# Patient Record
Sex: FEMALE | Race: WHITE | NOT HISPANIC OR LATINO | Employment: OTHER | URBAN - METROPOLITAN AREA
[De-identification: names, ages, dates, MRNs, and addresses within clinical notes are randomized per-mention and may not be internally consistent; named-entity substitution may affect disease eponyms.]

---

## 2019-12-11 ENCOUNTER — CONVERSION ENCOUNTER (OUTPATIENT)
Dept: FAMILY MEDICINE CLINIC | Facility: CLINIC | Age: 74
End: 2019-12-11

## 2019-12-11 ENCOUNTER — OFFICE VISIT CONVERTED (OUTPATIENT)
Dept: FAMILY MEDICINE CLINIC | Facility: CLINIC | Age: 74
End: 2019-12-11
Attending: NURSE PRACTITIONER

## 2019-12-26 ENCOUNTER — OFFICE VISIT CONVERTED (OUTPATIENT)
Dept: FAMILY MEDICINE CLINIC | Facility: CLINIC | Age: 74
End: 2019-12-26
Attending: FAMILY MEDICINE

## 2019-12-31 ENCOUNTER — OFFICE VISIT CONVERTED (OUTPATIENT)
Dept: FAMILY MEDICINE CLINIC | Facility: CLINIC | Age: 74
End: 2019-12-31
Attending: NURSE PRACTITIONER

## 2020-04-16 ENCOUNTER — TELEPHONE CONVERTED (OUTPATIENT)
Dept: FAMILY MEDICINE CLINIC | Facility: CLINIC | Age: 75
End: 2020-04-16
Attending: NURSE PRACTITIONER

## 2020-05-15 ENCOUNTER — HOSPITAL ENCOUNTER (OUTPATIENT)
Dept: FAMILY MEDICINE CLINIC | Facility: CLINIC | Age: 75
Discharge: HOME | End: 2020-05-15
Attending: NURSE PRACTITIONER

## 2020-05-15 ENCOUNTER — OFFICE VISIT CONVERTED (OUTPATIENT)
Dept: FAMILY MEDICINE CLINIC | Facility: CLINIC | Age: 75
End: 2020-05-15
Attending: NURSE PRACTITIONER

## 2020-05-15 LAB
ALBUMIN SERPL-MCNC: 4.3 G/DL (ref 3.5–5)
ALBUMIN/GLOB SERPL: 1.2 {RATIO} (ref 1.4–2.6)
ALP SERPL-CCNC: 61 U/L (ref 43–160)
ALT SERPL-CCNC: 19 U/L (ref 10–40)
ANION GAP SERPL CALC-SCNC: 18 MMOL/L (ref 8–19)
APPEARANCE UR: CLEAR
AST SERPL-CCNC: 28 U/L (ref 15–50)
BASOPHILS # BLD AUTO: 0.06 10*3/UL (ref 0–0.2)
BASOPHILS NFR BLD AUTO: 0.8 % (ref 0–3)
BILIRUB SERPL-MCNC: 0.29 MG/DL (ref 0.2–1.3)
BILIRUB UR QL: NEGATIVE
BUN SERPL-MCNC: 25 MG/DL (ref 5–25)
BUN/CREAT SERPL: 25 {RATIO} (ref 6–20)
CALCIUM SERPL-MCNC: 10.3 MG/DL (ref 8.7–10.4)
CHLORIDE SERPL-SCNC: 98 MMOL/L (ref 99–111)
CHOLEST SERPL-MCNC: 212 MG/DL (ref 107–200)
CHOLEST/HDLC SERPL: 4.3 {RATIO} (ref 3–6)
COLOR UR: YELLOW
CONV ABS IMM GRAN: 0.03 10*3/UL (ref 0–0.2)
CONV CO2: 27 MMOL/L (ref 22–32)
CONV COLLECTION SOURCE (UA): ABNORMAL
CONV IMMATURE GRAN: 0.4 % (ref 0–1.8)
CONV TOTAL PROTEIN: 8 G/DL (ref 6.3–8.2)
CONV UROBILINOGEN IN URINE BY AUTOMATED TEST STRIP: 1 {EHRLICHU}/DL (ref 0.1–1)
CREAT UR-MCNC: 1 MG/DL (ref 0.5–0.9)
DEPRECATED RDW RBC AUTO: 44.9 FL (ref 36.4–46.3)
EOSINOPHIL # BLD AUTO: 0.14 10*3/UL (ref 0–0.7)
EOSINOPHIL # BLD AUTO: 1.9 % (ref 0–7)
ERYTHROCYTE [DISTWIDTH] IN BLOOD BY AUTOMATED COUNT: 13.9 % (ref 11.7–14.4)
GFR SERPLBLD BASED ON 1.73 SQ M-ARVRAT: 56 ML/MIN/{1.73_M2}
GLOBULIN UR ELPH-MCNC: 3.7 G/DL (ref 2–3.5)
GLUCOSE SERPL-MCNC: 96 MG/DL (ref 65–99)
GLUCOSE UR QL: NEGATIVE MG/DL
HCT VFR BLD AUTO: 39.6 % (ref 37–47)
HDLC SERPL-MCNC: 49 MG/DL (ref 40–60)
HGB BLD-MCNC: 11.7 G/DL (ref 12–16)
HGB UR QL STRIP: NEGATIVE
KETONES UR QL STRIP: NEGATIVE MG/DL
LDLC SERPL CALC-MCNC: 130 MG/DL (ref 70–100)
LEUKOCYTE ESTERASE UR QL STRIP: NEGATIVE
LYMPHOCYTES # BLD AUTO: 2.15 10*3/UL (ref 1–5)
LYMPHOCYTES NFR BLD AUTO: 28.4 % (ref 20–45)
MCH RBC QN AUTO: 26 PG (ref 27–31)
MCHC RBC AUTO-ENTMCNC: 29.5 G/DL (ref 33–37)
MCV RBC AUTO: 88 FL (ref 81–99)
MONOCYTES # BLD AUTO: 0.52 10*3/UL (ref 0.2–1.2)
MONOCYTES NFR BLD AUTO: 6.9 % (ref 3–10)
NEUTROPHILS # BLD AUTO: 4.66 10*3/UL (ref 2–8)
NEUTROPHILS NFR BLD AUTO: 61.6 % (ref 30–85)
NITRITE UR QL STRIP: NEGATIVE
NRBC CBCN: 0 % (ref 0–0.7)
OSMOLALITY SERPL CALC.SUM OF ELEC: 292 MOSM/KG (ref 273–304)
PH UR STRIP.AUTO: 8.5 [PH] (ref 5–8)
PLATELET # BLD AUTO: 252 10*3/UL (ref 130–400)
PMV BLD AUTO: 11.2 FL (ref 9.4–12.3)
POTASSIUM SERPL-SCNC: 4.4 MMOL/L (ref 3.5–5.3)
PROT UR QL: NEGATIVE MG/DL
RBC # BLD AUTO: 4.5 10*6/UL (ref 4.2–5.4)
SODIUM SERPL-SCNC: 139 MMOL/L (ref 135–147)
SP GR UR: 1.02 (ref 1–1.03)
TRIGL SERPL-MCNC: 164 MG/DL (ref 40–150)
VLDLC SERPL-MCNC: 33 MG/DL (ref 5–37)
WBC # BLD AUTO: 7.56 10*3/UL (ref 4.8–10.8)

## 2020-06-10 ENCOUNTER — HOSPITAL ENCOUNTER (OUTPATIENT)
Dept: FAMILY MEDICINE CLINIC | Facility: CLINIC | Age: 75
Discharge: HOME | End: 2020-06-10
Attending: NURSE PRACTITIONER

## 2020-06-10 ENCOUNTER — OFFICE VISIT CONVERTED (OUTPATIENT)
Dept: FAMILY MEDICINE CLINIC | Facility: CLINIC | Age: 75
End: 2020-06-10
Attending: NURSE PRACTITIONER

## 2020-06-29 ENCOUNTER — OFFICE VISIT CONVERTED (OUTPATIENT)
Dept: NEUROSURGERY | Facility: CLINIC | Age: 75
End: 2020-06-29
Attending: PHYSICIAN ASSISTANT

## 2020-07-20 ENCOUNTER — HOSPITAL ENCOUNTER (OUTPATIENT)
Dept: MRI IMAGING | Facility: HOSPITAL | Age: 75
Discharge: HOME | End: 2020-07-20
Attending: PHYSICIAN ASSISTANT

## 2020-07-27 ENCOUNTER — OFFICE VISIT CONVERTED (OUTPATIENT)
Dept: NEUROSURGERY | Facility: CLINIC | Age: 75
End: 2020-07-27
Attending: PHYSICIAN ASSISTANT

## 2020-08-14 ENCOUNTER — HOSPITAL ENCOUNTER (OUTPATIENT)
Dept: OTHER | Facility: HOSPITAL | Age: 75
Discharge: HOME | End: 2020-08-14
Attending: PODIATRIST

## 2020-08-14 LAB
ANION GAP SERPL CALC-SCNC: 19 MMOL/L (ref 8–19)
BASOPHILS # BLD AUTO: 0.04 10*3/UL (ref 0–0.2)
BASOPHILS NFR BLD AUTO: 0.7 % (ref 0–3)
BUN SERPL-MCNC: 35 MG/DL (ref 5–25)
BUN/CREAT SERPL: 24 {RATIO} (ref 6–20)
CALCIUM SERPL-MCNC: 11 MG/DL (ref 8.7–10.4)
CHLORIDE SERPL-SCNC: 98 MMOL/L (ref 99–111)
CONV ABS IMM GRAN: 0.02 10*3/UL (ref 0–0.2)
CONV CO2: 26 MMOL/L (ref 22–32)
CONV IMMATURE GRAN: 0.3 % (ref 0–1.8)
CREAT UR-MCNC: 1.46 MG/DL (ref 0.5–0.9)
DEPRECATED RDW RBC AUTO: 46.1 FL (ref 36.4–46.3)
EOSINOPHIL # BLD AUTO: 0.15 10*3/UL (ref 0–0.7)
EOSINOPHIL # BLD AUTO: 2.5 % (ref 0–7)
ERYTHROCYTE [DISTWIDTH] IN BLOOD BY AUTOMATED COUNT: 14.6 % (ref 11.7–14.4)
GFR SERPLBLD BASED ON 1.73 SQ M-ARVRAT: 35 ML/MIN/{1.73_M2}
GLUCOSE SERPL-MCNC: 88 MG/DL (ref 65–99)
HCT VFR BLD AUTO: 40.7 % (ref 37–47)
HGB BLD-MCNC: 12.3 G/DL (ref 12–16)
INR PPP: 0.94 (ref 2–3)
LYMPHOCYTES # BLD AUTO: 1.68 10*3/UL (ref 1–5)
LYMPHOCYTES NFR BLD AUTO: 27.5 % (ref 20–45)
MCH RBC QN AUTO: 26.1 PG (ref 27–31)
MCHC RBC AUTO-ENTMCNC: 30.2 G/DL (ref 33–37)
MCV RBC AUTO: 86.4 FL (ref 81–99)
MONOCYTES # BLD AUTO: 0.46 10*3/UL (ref 0.2–1.2)
MONOCYTES NFR BLD AUTO: 7.5 % (ref 3–10)
NEUTROPHILS # BLD AUTO: 3.76 10*3/UL (ref 2–8)
NEUTROPHILS NFR BLD AUTO: 61.5 % (ref 30–85)
NRBC CBCN: 0 % (ref 0–0.7)
OSMOLALITY SERPL CALC.SUM OF ELEC: 295 MOSM/KG (ref 273–304)
PLATELET # BLD AUTO: 251 10*3/UL (ref 130–400)
PMV BLD AUTO: 10.4 FL (ref 9.4–12.3)
POTASSIUM SERPL-SCNC: 4.3 MMOL/L (ref 3.5–5.3)
PROTHROMBIN TIME: 10.2 S (ref 9.4–12)
RBC # BLD AUTO: 4.71 10*6/UL (ref 4.2–5.4)
SODIUM SERPL-SCNC: 139 MMOL/L (ref 135–147)
WBC # BLD AUTO: 6.11 10*3/UL (ref 4.8–10.8)

## 2020-08-21 ENCOUNTER — OFFICE VISIT CONVERTED (OUTPATIENT)
Dept: FAMILY MEDICINE CLINIC | Facility: CLINIC | Age: 75
End: 2020-08-21
Attending: NURSE PRACTITIONER

## 2020-08-21 ENCOUNTER — HOSPITAL ENCOUNTER (OUTPATIENT)
Dept: FAMILY MEDICINE CLINIC | Facility: CLINIC | Age: 75
Discharge: HOME | End: 2020-08-21
Attending: NURSE PRACTITIONER

## 2020-08-21 LAB
ALBUMIN SERPL-MCNC: 4.7 G/DL (ref 3.5–5)
ALBUMIN/GLOB SERPL: 1.5 {RATIO} (ref 1.4–2.6)
ALP SERPL-CCNC: 67 U/L (ref 43–160)
ALT SERPL-CCNC: 25 U/L (ref 10–40)
ANION GAP SERPL CALC-SCNC: 21 MMOL/L (ref 8–19)
AST SERPL-CCNC: 37 U/L (ref 15–50)
BASOPHILS # BLD AUTO: 0.04 10*3/UL (ref 0–0.2)
BASOPHILS NFR BLD AUTO: 0.6 % (ref 0–3)
BILIRUB SERPL-MCNC: 0.32 MG/DL (ref 0.2–1.3)
BUN SERPL-MCNC: 26 MG/DL (ref 5–25)
BUN/CREAT SERPL: 19 {RATIO} (ref 6–20)
CALCIUM SERPL-MCNC: 11.2 MG/DL (ref 8.7–10.4)
CHLORIDE SERPL-SCNC: 97 MMOL/L (ref 99–111)
CONV ABS IMM GRAN: 0.01 10*3/UL (ref 0–0.2)
CONV CO2: 26 MMOL/L (ref 22–32)
CONV IMMATURE GRAN: 0.1 % (ref 0–1.8)
CONV TOTAL PROTEIN: 7.9 G/DL (ref 6.3–8.2)
CREAT UR-MCNC: 1.39 MG/DL (ref 0.5–0.9)
DEPRECATED RDW RBC AUTO: 47.6 FL (ref 36.4–46.3)
EOSINOPHIL # BLD AUTO: 0.15 10*3/UL (ref 0–0.7)
EOSINOPHIL # BLD AUTO: 2.2 % (ref 0–7)
ERYTHROCYTE [DISTWIDTH] IN BLOOD BY AUTOMATED COUNT: 14.9 % (ref 11.7–14.4)
GFR SERPLBLD BASED ON 1.73 SQ M-ARVRAT: 37 ML/MIN/{1.73_M2}
GLOBULIN UR ELPH-MCNC: 3.2 G/DL (ref 2–3.5)
GLUCOSE SERPL-MCNC: 80 MG/DL (ref 65–99)
HCT VFR BLD AUTO: 39.9 % (ref 37–47)
HGB BLD-MCNC: 11.7 G/DL (ref 12–16)
LYMPHOCYTES # BLD AUTO: 1.96 10*3/UL (ref 1–5)
LYMPHOCYTES NFR BLD AUTO: 29.3 % (ref 20–45)
MCH RBC QN AUTO: 25.8 PG (ref 27–31)
MCHC RBC AUTO-ENTMCNC: 29.3 G/DL (ref 33–37)
MCV RBC AUTO: 87.9 FL (ref 81–99)
MONOCYTES # BLD AUTO: 0.5 10*3/UL (ref 0.2–1.2)
MONOCYTES NFR BLD AUTO: 7.5 % (ref 3–10)
NEUTROPHILS # BLD AUTO: 4.03 10*3/UL (ref 2–8)
NEUTROPHILS NFR BLD AUTO: 60.3 % (ref 30–85)
NRBC CBCN: 0 % (ref 0–0.7)
OSMOLALITY SERPL CALC.SUM OF ELEC: 292 MOSM/KG (ref 273–304)
PLATELET # BLD AUTO: 249 10*3/UL (ref 130–400)
PMV BLD AUTO: 11.2 FL (ref 9.4–12.3)
POTASSIUM SERPL-SCNC: 4.8 MMOL/L (ref 3.5–5.3)
RBC # BLD AUTO: 4.54 10*6/UL (ref 4.2–5.4)
SODIUM SERPL-SCNC: 139 MMOL/L (ref 135–147)
WBC # BLD AUTO: 6.69 10*3/UL (ref 4.8–10.8)

## 2020-08-22 LAB — HCV AB S/CO SERPL IA: <0.1 S/CO RATIO (ref 0–0.9)

## 2020-08-28 ENCOUNTER — HOSPITAL ENCOUNTER (OUTPATIENT)
Dept: OTHER | Facility: HOSPITAL | Age: 75
Discharge: HOME | End: 2020-08-28
Attending: NURSE PRACTITIONER

## 2020-08-28 ENCOUNTER — HOSPITAL ENCOUNTER (OUTPATIENT)
Dept: FAMILY MEDICINE CLINIC | Facility: CLINIC | Age: 75
Discharge: HOME | End: 2020-08-28
Attending: NURSE PRACTITIONER

## 2020-08-28 LAB
ALBUMIN SERPL-MCNC: 4.4 G/DL (ref 3.5–5)
ALBUMIN/GLOB SERPL: 1.2 {RATIO} (ref 1.4–2.6)
ALP SERPL-CCNC: 64 U/L (ref 43–160)
ALT SERPL-CCNC: 23 U/L (ref 10–40)
ANION GAP SERPL CALC-SCNC: 21 MMOL/L (ref 8–19)
AST SERPL-CCNC: 29 U/L (ref 15–50)
BILIRUB SERPL-MCNC: 0.34 MG/DL (ref 0.2–1.3)
BUN SERPL-MCNC: 30 MG/DL (ref 5–25)
BUN/CREAT SERPL: 20 {RATIO} (ref 6–20)
CALCIUM SERPL-MCNC: 11 MG/DL (ref 8.7–10.4)
CHLORIDE SERPL-SCNC: 100 MMOL/L (ref 99–111)
CONV CO2: 22 MMOL/L (ref 22–32)
CONV TOTAL PROTEIN: 8 G/DL (ref 6.3–8.2)
CREAT UR-MCNC: 1.48 MG/DL (ref 0.5–0.9)
GFR SERPLBLD BASED ON 1.73 SQ M-ARVRAT: 35 ML/MIN/{1.73_M2}
GLOBULIN UR ELPH-MCNC: 3.6 G/DL (ref 2–3.5)
GLUCOSE SERPL-MCNC: 125 MG/DL (ref 65–99)
MAGNESIUM SERPL-MCNC: 2.03 MG/DL (ref 1.6–2.3)
OSMOLALITY SERPL CALC.SUM OF ELEC: 294 MOSM/KG (ref 273–304)
POTASSIUM SERPL-SCNC: 4.6 MMOL/L (ref 3.5–5.3)
SODIUM SERPL-SCNC: 138 MMOL/L (ref 135–147)

## 2020-09-02 ENCOUNTER — OFFICE VISIT CONVERTED (OUTPATIENT)
Dept: NEUROSURGERY | Facility: CLINIC | Age: 75
End: 2020-09-02
Attending: PHYSICIAN ASSISTANT

## 2020-09-02 ENCOUNTER — CONVERSION ENCOUNTER (OUTPATIENT)
Dept: OTHER | Facility: HOSPITAL | Age: 75
End: 2020-09-02

## 2020-09-09 LAB — PTH RELATED PROT SERPL-SCNC: <2 PMOL/L

## 2020-10-20 ENCOUNTER — OFFICE VISIT CONVERTED (OUTPATIENT)
Dept: NEUROLOGY | Facility: CLINIC | Age: 75
End: 2020-10-20
Attending: PSYCHIATRY & NEUROLOGY

## 2020-10-20 ENCOUNTER — CONVERSION ENCOUNTER (OUTPATIENT)
Dept: NEUROLOGY | Facility: CLINIC | Age: 75
End: 2020-10-20

## 2020-10-28 ENCOUNTER — OFFICE VISIT CONVERTED (OUTPATIENT)
Dept: NEUROSURGERY | Facility: CLINIC | Age: 75
End: 2020-10-28
Attending: PHYSICIAN ASSISTANT

## 2020-10-28 ENCOUNTER — CONVERSION ENCOUNTER (OUTPATIENT)
Dept: NEUROLOGY | Facility: CLINIC | Age: 75
End: 2020-10-28

## 2020-11-18 ENCOUNTER — HOSPITAL ENCOUNTER (OUTPATIENT)
Dept: OTHER | Facility: HOSPITAL | Age: 75
Discharge: HOME | End: 2020-11-18
Attending: NURSE PRACTITIONER

## 2020-12-11 ENCOUNTER — CONVERSION ENCOUNTER (OUTPATIENT)
Dept: FAMILY MEDICINE CLINIC | Facility: CLINIC | Age: 75
End: 2020-12-11

## 2020-12-11 ENCOUNTER — OFFICE VISIT CONVERTED (OUTPATIENT)
Dept: FAMILY MEDICINE CLINIC | Facility: CLINIC | Age: 75
End: 2020-12-11
Attending: NURSE PRACTITIONER

## 2020-12-11 ENCOUNTER — HOSPITAL ENCOUNTER (OUTPATIENT)
Dept: FAMILY MEDICINE CLINIC | Facility: CLINIC | Age: 75
Discharge: HOME | End: 2020-12-11
Attending: NURSE PRACTITIONER

## 2020-12-11 LAB
ANION GAP SERPL CALC-SCNC: 16 MMOL/L (ref 8–19)
BASOPHILS # BLD AUTO: 0.04 10*3/UL (ref 0–0.2)
BASOPHILS NFR BLD AUTO: 0.5 % (ref 0–3)
BNP SERPL-MCNC: 34 PG/ML (ref 0–900)
BUN SERPL-MCNC: 25 MG/DL (ref 5–25)
BUN/CREAT SERPL: 21 {RATIO} (ref 6–20)
CALCIUM SERPL-MCNC: 10.9 MG/DL (ref 8.7–10.4)
CHLORIDE SERPL-SCNC: 100 MMOL/L (ref 99–111)
CONV ABS IMM GRAN: 0.02 10*3/UL (ref 0–0.2)
CONV CO2: 27 MMOL/L (ref 22–32)
CONV IMMATURE GRAN: 0.2 % (ref 0–1.8)
CREAT UR-MCNC: 1.17 MG/DL (ref 0.5–0.9)
DEPRECATED RDW RBC AUTO: 43.7 FL (ref 36.4–46.3)
EOSINOPHIL # BLD AUTO: 0.12 10*3/UL (ref 0–0.7)
EOSINOPHIL # BLD AUTO: 1.5 % (ref 0–7)
ERYTHROCYTE [DISTWIDTH] IN BLOOD BY AUTOMATED COUNT: 14.4 % (ref 11.7–14.4)
FERRITIN SERPL-MCNC: 98 NG/ML (ref 10–200)
FOLATE SERPL-MCNC: >20 NG/ML (ref 4.8–20)
GFR SERPLBLD BASED ON 1.73 SQ M-ARVRAT: 46 ML/MIN/{1.73_M2}
GLUCOSE SERPL-MCNC: 104 MG/DL (ref 65–99)
HCT VFR BLD AUTO: 39.1 % (ref 37–47)
HGB BLD-MCNC: 11.4 G/DL (ref 12–16)
IRON SATN MFR SERPL: 10 % (ref 20–55)
IRON SERPL-MCNC: 39 UG/DL (ref 60–170)
LYMPHOCYTES # BLD AUTO: 2.51 10*3/UL (ref 1–5)
LYMPHOCYTES NFR BLD AUTO: 30.4 % (ref 20–45)
MCH RBC QN AUTO: 24.5 PG (ref 27–31)
MCHC RBC AUTO-ENTMCNC: 29.2 G/DL (ref 33–37)
MCV RBC AUTO: 84.1 FL (ref 81–99)
MONOCYTES # BLD AUTO: 0.57 10*3/UL (ref 0.2–1.2)
MONOCYTES NFR BLD AUTO: 6.9 % (ref 3–10)
NEUTROPHILS # BLD AUTO: 4.99 10*3/UL (ref 2–8)
NEUTROPHILS NFR BLD AUTO: 60.5 % (ref 30–85)
NRBC CBCN: 0 % (ref 0–0.7)
OSMOLALITY SERPL CALC.SUM OF ELEC: 291 MOSM/KG (ref 273–304)
PLATELET # BLD AUTO: 300 10*3/UL (ref 130–400)
PMV BLD AUTO: 11.1 FL (ref 9.4–12.3)
POTASSIUM SERPL-SCNC: 4.9 MMOL/L (ref 3.5–5.3)
RBC # BLD AUTO: 4.65 10*6/UL (ref 4.2–5.4)
SODIUM SERPL-SCNC: 138 MMOL/L (ref 135–147)
T4 FREE SERPL-MCNC: 1.2 NG/DL (ref 0.9–1.8)
TIBC SERPL-MCNC: 390 UG/DL (ref 245–450)
TRANSFERRIN SERPL-MCNC: 273 MG/DL (ref 250–380)
TSH SERPL-ACNC: 1.93 M[IU]/L (ref 0.27–4.2)
VIT B12 SERPL-MCNC: 514 PG/ML (ref 211–911)
WBC # BLD AUTO: 8.25 10*3/UL (ref 4.8–10.8)

## 2020-12-18 ENCOUNTER — HOSPITAL ENCOUNTER (OUTPATIENT)
Dept: OTHER | Facility: HOSPITAL | Age: 75
Discharge: HOME | End: 2020-12-18
Attending: NURSE PRACTITIONER

## 2020-12-22 ENCOUNTER — OFFICE VISIT CONVERTED (OUTPATIENT)
Dept: NEUROSURGERY | Facility: CLINIC | Age: 75
End: 2020-12-22
Attending: NEUROLOGICAL SURGERY

## 2021-01-07 ENCOUNTER — HOSPITAL ENCOUNTER (OUTPATIENT)
Dept: OTHER | Facility: HOSPITAL | Age: 76
Discharge: HOME | End: 2021-01-07
Attending: NURSE PRACTITIONER

## 2021-01-07 LAB
25(OH)D3 SERPL-MCNC: 42.7 NG/ML (ref 30–100)
ALBUMIN SERPL-MCNC: 4.4 G/DL (ref 3.5–5)
ALBUMIN/GLOB SERPL: 1.2 {RATIO} (ref 1.4–2.6)
ALP SERPL-CCNC: 72 U/L (ref 43–160)
ALT SERPL-CCNC: 18 U/L (ref 10–40)
ANION GAP SERPL CALC-SCNC: 21 MMOL/L (ref 8–19)
APPEARANCE UR: ABNORMAL
AST SERPL-CCNC: 27 U/L (ref 15–50)
BASOPHILS # BLD AUTO: 0.05 10*3/UL (ref 0–0.2)
BASOPHILS NFR BLD AUTO: 0.6 % (ref 0–3)
BILIRUB SERPL-MCNC: 0.25 MG/DL (ref 0.2–1.3)
BILIRUB UR QL: NEGATIVE
BUN SERPL-MCNC: 20 MG/DL (ref 5–25)
BUN/CREAT SERPL: 19 {RATIO} (ref 6–20)
CALCIUM SERPL-MCNC: 10.6 MG/DL (ref 8.7–10.4)
CHLORIDE SERPL-SCNC: 100 MMOL/L (ref 99–111)
COLOR UR: YELLOW
CONV ABS IMM GRAN: 0.02 10*3/UL (ref 0–0.2)
CONV BACTERIA: NEGATIVE
CONV CO2: 22 MMOL/L (ref 22–32)
CONV COLLECTION SOURCE (UA): ABNORMAL
CONV CREATININE URINE, RANDOM: 97.5 MG/DL (ref 10–300)
CONV IMMATURE GRAN: 0.2 % (ref 0–1.8)
CONV PROTEIN TO CREATININE RATIO (RANDOM URINE): 0.08 {RATIO} (ref 0–0.1)
CONV TOTAL PROTEIN: 8.1 G/DL (ref 6.3–8.2)
CONV UROBILINOGEN IN URINE BY AUTOMATED TEST STRIP: 1 {EHRLICHU}/DL (ref 0.1–1)
CREAT UR-MCNC: 1.08 MG/DL (ref 0.5–0.9)
DEPRECATED RDW RBC AUTO: 45.4 FL (ref 36.4–46.3)
EOSINOPHIL # BLD AUTO: 0.11 10*3/UL (ref 0–0.7)
EOSINOPHIL # BLD AUTO: 1.4 % (ref 0–7)
ERYTHROCYTE [DISTWIDTH] IN BLOOD BY AUTOMATED COUNT: 14.9 % (ref 11.7–14.4)
GFR SERPLBLD BASED ON 1.73 SQ M-ARVRAT: 50 ML/MIN/{1.73_M2}
GLOBULIN UR ELPH-MCNC: 3.7 G/DL (ref 2–3.5)
GLUCOSE SERPL-MCNC: 86 MG/DL (ref 65–99)
GLUCOSE UR QL: NEGATIVE MG/DL
HCT VFR BLD AUTO: 40.1 % (ref 37–47)
HGB BLD-MCNC: 11.5 G/DL (ref 12–16)
HGB UR QL STRIP: NEGATIVE
KETONES UR QL STRIP: NEGATIVE MG/DL
LEUKOCYTE ESTERASE UR QL STRIP: NEGATIVE
LYMPHOCYTES # BLD AUTO: 2.53 10*3/UL (ref 1–5)
LYMPHOCYTES NFR BLD AUTO: 31.4 % (ref 20–45)
MCH RBC QN AUTO: 23.9 PG (ref 27–31)
MCHC RBC AUTO-ENTMCNC: 28.7 G/DL (ref 33–37)
MCV RBC AUTO: 83.4 FL (ref 81–99)
MONOCYTES # BLD AUTO: 0.55 10*3/UL (ref 0.2–1.2)
MONOCYTES NFR BLD AUTO: 6.8 % (ref 3–10)
NEUTROPHILS # BLD AUTO: 4.81 10*3/UL (ref 2–8)
NEUTROPHILS NFR BLD AUTO: 59.6 % (ref 30–85)
NITRITE UR QL STRIP: NEGATIVE
NRBC CBCN: 0 % (ref 0–0.7)
OSMOLALITY SERPL CALC.SUM OF ELEC: 290 MOSM/KG (ref 273–304)
PH UR STRIP.AUTO: 6.5 [PH] (ref 5–8)
PHOSPHATE SERPL-MCNC: 3.1 MG/DL (ref 2.4–4.5)
PLATELET # BLD AUTO: 265 10*3/UL (ref 130–400)
PMV BLD AUTO: 11 FL (ref 9.4–12.3)
POTASSIUM SERPL-SCNC: 4.3 MMOL/L (ref 3.5–5.3)
PROT UR QL: NEGATIVE MG/DL
PROT UR-MCNC: 7.5 MG/DL
PTH-INTACT SERPL-MCNC: 52.6 PG/ML (ref 11.1–79.5)
RBC # BLD AUTO: 4.81 10*6/UL (ref 4.2–5.4)
RBC #/AREA URNS HPF: ABNORMAL /[HPF]
SODIUM SERPL-SCNC: 139 MMOL/L (ref 135–147)
SP GR UR: 1.02 (ref 1–1.03)
WBC # BLD AUTO: 8.07 10*3/UL (ref 4.8–10.8)
WBC #/AREA URNS HPF: ABNORMAL /[HPF]

## 2021-01-08 ENCOUNTER — OFFICE VISIT CONVERTED (OUTPATIENT)
Dept: CARDIOLOGY | Facility: CLINIC | Age: 76
End: 2021-01-08
Attending: INTERNAL MEDICINE

## 2021-01-12 ENCOUNTER — HOSPITAL ENCOUNTER (OUTPATIENT)
Dept: NUCLEAR MEDICINE | Facility: HOSPITAL | Age: 76
Discharge: HOME | End: 2021-01-12
Attending: INTERNAL MEDICINE

## 2021-03-02 ENCOUNTER — HOSPITAL ENCOUNTER (OUTPATIENT)
Dept: VACCINE CLINIC | Facility: HOSPITAL | Age: 76
Discharge: HOME | End: 2021-03-02
Attending: INTERNAL MEDICINE

## 2021-03-22 ENCOUNTER — OFFICE VISIT CONVERTED (OUTPATIENT)
Dept: FAMILY MEDICINE CLINIC | Facility: CLINIC | Age: 76
End: 2021-03-22
Attending: NURSE PRACTITIONER

## 2021-03-23 ENCOUNTER — HOSPITAL ENCOUNTER (OUTPATIENT)
Dept: VACCINE CLINIC | Facility: HOSPITAL | Age: 76
Discharge: HOME | End: 2021-03-23
Attending: INTERNAL MEDICINE

## 2021-04-06 ENCOUNTER — HOSPITAL ENCOUNTER (OUTPATIENT)
Dept: OTHER | Facility: HOSPITAL | Age: 76
Discharge: HOME | End: 2021-04-06
Attending: NURSE PRACTITIONER

## 2021-05-09 VITALS
DIASTOLIC BLOOD PRESSURE: 70 MMHG | TEMPERATURE: 97.5 F | HEART RATE: 96 BPM | SYSTOLIC BLOOD PRESSURE: 114 MMHG | BODY MASS INDEX: 33.88 KG/M2 | HEIGHT: 59 IN | OXYGEN SATURATION: 98 % | WEIGHT: 168.06 LBS

## 2021-05-09 VITALS
SYSTOLIC BLOOD PRESSURE: 140 MMHG | BODY MASS INDEX: 33.77 KG/M2 | TEMPERATURE: 98.4 F | WEIGHT: 172 LBS | HEIGHT: 60 IN | DIASTOLIC BLOOD PRESSURE: 80 MMHG | OXYGEN SATURATION: 98 % | HEART RATE: 90 BPM

## 2021-05-09 VITALS
OXYGEN SATURATION: 98 % | HEART RATE: 104 BPM | WEIGHT: 172.5 LBS | DIASTOLIC BLOOD PRESSURE: 68 MMHG | TEMPERATURE: 97.2 F | BODY MASS INDEX: 34.77 KG/M2 | SYSTOLIC BLOOD PRESSURE: 116 MMHG | HEIGHT: 59 IN

## 2021-05-09 VITALS
HEART RATE: 94 BPM | OXYGEN SATURATION: 94 % | TEMPERATURE: 97.1 F | SYSTOLIC BLOOD PRESSURE: 128 MMHG | HEIGHT: 60 IN | WEIGHT: 172.5 LBS | DIASTOLIC BLOOD PRESSURE: 80 MMHG | BODY MASS INDEX: 33.86 KG/M2

## 2021-05-09 VITALS
DIASTOLIC BLOOD PRESSURE: 75 MMHG | WEIGHT: 165.37 LBS | HEART RATE: 100 BPM | TEMPERATURE: 97.3 F | OXYGEN SATURATION: 95 % | SYSTOLIC BLOOD PRESSURE: 99 MMHG

## 2021-05-09 VITALS
OXYGEN SATURATION: 96 % | TEMPERATURE: 97.2 F | SYSTOLIC BLOOD PRESSURE: 140 MMHG | DIASTOLIC BLOOD PRESSURE: 72 MMHG | HEART RATE: 100 BPM | WEIGHT: 172.25 LBS

## 2021-05-09 VITALS
SYSTOLIC BLOOD PRESSURE: 118 MMHG | HEART RATE: 78 BPM | TEMPERATURE: 96.9 F | OXYGEN SATURATION: 96 % | BODY MASS INDEX: 31.61 KG/M2 | DIASTOLIC BLOOD PRESSURE: 70 MMHG | HEIGHT: 60 IN | WEIGHT: 161 LBS

## 2021-05-09 VITALS
TEMPERATURE: 97.1 F | HEIGHT: 61 IN | WEIGHT: 165 LBS | HEART RATE: 82 BPM | DIASTOLIC BLOOD PRESSURE: 90 MMHG | BODY MASS INDEX: 31.15 KG/M2 | SYSTOLIC BLOOD PRESSURE: 150 MMHG | OXYGEN SATURATION: 98 %

## 2021-05-10 NOTE — H&P
History and Physical      Patient Name: Asya Burris   Patient ID: [id number]   Sex: Female   YOB: 1945    Primary Care Provider: Jess FLORENCE   Referring Provider: Jess FLORENCE    Visit Date: January 8, 2021    Provider: Mario Valdivia MD   Location: [facility]   Location Address: [address] [place] [address], [facility]   [place], [place]  [id number]   Location Phone: [phone]          Chief Complaint     Shortness of breath.       History Of Present Illness  Consult requested by: Jess FLORENCE   Asya Burris is a 75-year-old White female who has not been followed by Cardiology in the past. She is accompanied by her daughter. Over time, she has noticed more shortness of breath with exertion, with more common activities, such as walking up the steps, or doing light housework she tires easily. She occasionally has a minor lightheadedness with this and has had some intermittent chest pressure, as well. The chest pressure is not consistent and does not occur every time she exerts herself. Her weight has been stable. She denies significant palpitations. Her medications have been stable. She has not had a recent cardiac workup.   PAST MEDICAL & SURGICAL HISTORY: Anxiety/Depression; Arthritis; Hypertension; Sleep apnea, which is not treated over the years. Patient cannot tolerate CPAP therapy; Appendectomy; Hysterectomy; Ankle and foot surgery; Carpal tunnel release.   PSYCHOSOCIAL HISTORY: No tobacco use. No alcohol use. Moderate caffeine intake. She is  x18 years. She is retired.   FAMILY HISTORY: Positive for hypertension.   CURRENT MEDICATIONS: Bupropion  mg daily; losartan 100 mg daily; omeprazole 40 mg daily; sertraline 50 mg daily; trazodone 50 mg daily.   ALLERGIES: No known drug allergies.       Review of Systems  · Constitutional  o Admits  o : fatigue  o Denies  o : recent weight changes   · Eyes  o Denies  o : blurred  "vision  · HENT  o Admits  o : hearing loss or ringing  o Denies  o : chronic sinus problem, swollen glands in neck  · Cardiovascular  o Admits  o : shortness of breath with activities   o Denies  o : chest pain, palpitations (fast, fluttering, or skipping beats), swelling (feet, ankles, hands)  · Respiratory  o Admits  o : chronic or frequent cough  o Denies  o : asthma or wheezing, COPD  · Gastrointestinal  o Denies  o : ulcers, nausea or vomiting  · Neurologic  o Admits  o : lightheaded or dizzy, headaches  o Denies  o : stroke  · Musculoskeletal  o Admits  o : joint pain, back pain  · Endocrine  o Denies  o : thyroid disease, diabetes, heat or cold intolerance, excessive thirst or urination  · Heme-Lymph  o Admits  o : anemia  o Denies  o : bleeding or bruising tendency      Vitals  Date Time BP Position Site L\R Cuff Size HR RR TEMP (F) WT  HT  BMI kg/m2 BSA m2 O2 Sat FR L/min FiO2 HC       01/08/2021 12:15 /80 Sitting    92 - R   165lbs 0oz 5'  1\" 31.18 1.79             Physical Examination  · Constitutional  o Appearance  o : Elderly, White female, pleasant, in no acute distress.  · Head and Face  o HEENT  o : No pallor, anicteric. Eyes normal. Moist mucous membranes.  · Neck  o Inspection/Palpation  o : Supple.   o Jugular Veins  o : No JVD. No carotid bruits.  · Respiratory  o Auscultation of Lungs  o : Clear to auscultation bilaterally. No crackles or wheezing.  · Cardiovascular  o Heart  o : S1, S2 is normally heard. No S3. No murmur, rubs, or gallops.  · Gastrointestinal  o Abdominal Examination  o : Soft, non-distended. No palpable hepatosplenomegaly. Bowel sounds heard in all four quadrants.  · Musculoskeletal  o General  o : Normal muscle tone and strength.  · Skin and Subcutaneous Tissue  o General Inspection  o : No skin rashes.  · Extremities  o Extremities  o : Warm and well perfused. Distal pulses present. No pitting pedal edema.  · EKG  o EKG  o : Performed in the office today, reviewed by " me.   o Indications  o : Shortness of breath.  o Results  o : Sinus rhythm, tiny inferior Q waves, no ST changes, otherwise normal EKG.  o Comparison  o : No previous for comparison.   · Labs  o Labs  o : Recent CBC showed slight anemia, hemoglobin 11.4, BUN 25, creatinine 1.17, iron 39, which is low, BNP 34, which is normal.   · Echocardiogram  o Echocardiogram  o : Recent echocardiogram showed an ejection fraction of 60% with grade I diastolic dysfunction, trace tricuspid regurgitation, and mild fibrotic changes of the mitral and aortic valves.  · Data  o Data  o : Primary Care records reviewed.          Assessment     1.  Progressive shortness of breath with exertion.  This has been going on for the better part of 2 years.  Her        symptoms have been somewhat indolent.  She has minor chest discomfort at times.  Her echo done        recently shows no structural abnormalities that would explain her symptoms.  She is mildly anemic, but not        to the extent I would expect her to have shortness of breath.  She does admit to being less active over the        past 2 years since relocating from [place].  Her risk factors for CAD include age and hypertension.    2.  Hypertension.  Currently controlled.  3.  Sleep apnea.  Untreated since the patient cannot tolerate CPAP therapy.       Plan     Stress imaging will be scheduled to evaluate for ischemic heart disease.  I agree with her current medical therapy.  If her stress imaging appears normal, I would recommend that she try to have some sort of exercise program, ideally a walking program to try to improve her exertional tolerance and stamina.  If she continues to have significant limiting symptoms despite this, coronary angiography can be considered in the future, but currently, I would only recommend that if we see significant ischemic defects on a stress test.  She is agreeable with this plan.  We will call the patient with the results, and otherwise follow as  needed.      It is a pleasure to assist in her care.  Please let me know if you have any questions regarding her case.    GEORGES Valdivia MD  CBD:vm             Electronically Signed by: Tori Amaya-, Other -Author on January 11, 2021 10:00:43 AM  Electronically Co-signed by: Mario Valdivia MD -Reviewer on January 11, 2021 12:38:50 PM

## 2021-05-10 NOTE — H&P
History and Physical      Patient Name: Asya Burris   Patient ID: [id number]   Sex: Female   YOB: 1945    Referring Provider: Kaylee FLORENCE    Visit Date: June 29, 2020    Provider: Codi Carey PA-C   Location: [facility]   Location Address: [address] [place] [address]  [id number]   Location Phone: [phone]          Chief Complaint     Patient is here with complaints of hip and foot pain.       History Of Present Illness  The patient is an 74 year old female, who presents on referral from Kaylee FLORENCE, for a neurosurgical evaluation of low back pain and left leg pain.   The left leg pain involves the left lower leg in a nonspecific distribution. The pain developed gradually several years ago. It is 10/10 in severity has an aching, a dull, and a sharp quality and radiates into the left foot in a nonspecific distribution. The pain has been constant. The pain tends to be maximal in the afternoon. The patient states the pain is aggravated by prolonged standing, walking long distances, and Getting up from seated position. No alleviating factors are reported.   She also reports urinary incontinence. The incontinence has been ongoing and developed several months ago. The patient has no prior history of neck or back surgery.   RECENT INTERVENTIONS:  She has not had any recent treatment for this problem, except as above.   INFORMATION REVIEWED:  The following information was reviewed: radiology reports and images. Lumbar radiographs revealed degenerative disk disease. The degenerative disc disease is present at multiple levels.      She also complains of neck pain and arm stiffness.       Review of Systems  · Constitutional  o Denies  o : chills, excessive sweating, fatigue, fever, sycope/passing out, weight gain, weight loss  · Eyes  o Denies  o : changes in vision, blurry vision, double vision  · HENT  o Denies  o : loss of hearing, ringing in the ears, ear  aches, sore throat, nasal congestion, sinus pain, nose bleeds, seasonal allergies  · Cardiovascular  o Denies  o : blood clots, swollen legs, anemia, easy burising or bleeding, transfusions  · Respiratory  o Denies  o : shortness of breath, dry cough, productive cough, pneumonia, COPD  · Gastrointestinal  o Denies  o : difficulty swallowing, reflux  · Genitourinary  o Denies  o : incontinence  · Neurologic  o Denies  o : headache, seizure, stroke, tremor, loss of balance, falls, dizziness/vertigo, difficulty with sleep, numbness/tingling/paresthesia , difficulty with coordination, difficulty with dexterity, weakness  · Musculoskeletal  o Admits  o : joint pain, low back pain  o Denies  o : neck stiffness/pain, swollen lymph nodes, muscle aches, weakness, spasms, sciatica, pain radiating in arm, pain radiating in leg  · Endocrine  o Denies  o : diabetes, thyroid disorder  · Psychiatric  o Denies  o : anxiety, depression      Vitals  Date Time BP Position Site L\R Cuff Size HR RR TEMP (F) WT  HT  BMI kg/m2 BSA m2 O2 Sat        06/29/2020 11:23 AM        97.5               Physical Examination  · Constitutional  o Appearance  o : well-nourished, well developed, alert, in no acute distress  · Respiratory  o Respiratory Effort  o : breathing unlabored  · Cardiovascular  o Peripheral Vascular System  o :   § Extremities  § : no edema or cyanosis  · Musculoskeletal  o Spine  o :   § Inspection/Palpation  § : tenderness to palpation present in left lower back  o Right Lower Extremity  o :   § Inspection/Palpation  § : no joint or limb tenderness to palpation, no edema present, no ecchymosis  § Joint Stability  § : joint stability within normal limits  § Range of Motion  § : range of motion normal, no joint crepitations present, no pain on motion, Glen's test negative  o Left Lower Extremity  o :   § Inspection/Palpation  § : no joint or limb tenderness to palpation, no edema present, no ecchymosis  § Joint  Stability  § : joint stability within normal limits  § Range of Motion  § : range of motion normal, no joint crepitations present, no pain on motion, Glen's test negative  · Skin and Subcutaneous Tissue  o Extremities  o :   § Right Lower Extremity  § : no lesions or areas of discoloration  § Left Lower Extremity  § : no lesions or areas of discoloration  o Back  o : no lesions or areas of discoloration  · Neurologic  o Mental Status Examination  o :   § Orientation  § : alert and oriented to time, person, place and events  o Motor Examination  o :   § RLE Strength  § : strength normal  § RLE Motor Function  § : tone normal, no atrophy, no abnormal movements noted  § LLE Strength  § : strength normal  § LLE Motor Function  § : tone normal, no atrophy, no abnormal movements noted  o Reflexes  o :   § RLE  § : knee and ankle reflexes 2/4, SLR negative  § LLE  § : knee and ankle reflexes 2/4, SLR negative  o Sensation  o :   § Light Touch  § : sensation intact to light touch in extremities  o Gait and Station  o :   § Gait Screening  § : limping gait. Unsteady gait.   · Psychiatric  o Mood and Affect  o : mood normal, affect appropriate     Tenderness in neck.           Assessment  · Lumbago/low back pain     724.3/M54.40  · Sciatica     724.3/M54.30  · Cervicalgia     723.1/M54.2  · Cervical radiculopathy     723.4/M54.12      Plan  · Orders  o MRI lumbar spine w/o contrast (50041) - 724.3/M54.30, 724.3/M54.40 - 06/29/2020  o MRI cervical spine w/o contrast (93559) - 723.1/M54.2, 723.4/M54.12 - 06/29/2020  · Medications  o Medications have been Reconciled  o Transition of Care or Provider Policy  · Instructions  o Encouraged to follow-up with Primary Care Provider for preventative care.  o The ROS and the PFSH were reviewed at today's visit.  o I have discussed the risks and benefits of surgery versus physical therapy, epidural steroids, and other conservative forms of treatment.  o Given these options, the patient  wishes to exhaust all forms of non-operative management.  o Handouts provided: Non-Surgical Treatments for Back Pain/Degenerative Disc Disease.  o We have discussed the benefits of core strengthening. Patient will work on improving the core muscle strength with low impact activities such as walking, swimming, Pilates, etc.   o Call or return to office if symptoms worsen or persist.  o Will order MRI of Lspine and Cspine and return afterwards. Has been seeing podiatrist and is getting a boot made for left foot pain.   o Electronically Identified Patient Education Materials Provided Electronically  · Disposition  o Call or Return if symptoms worsen or persist.            Electronically Signed by: Codi Carey PA-C -Author on June 29, 2020 11:55:03 AM

## 2021-05-13 NOTE — PROGRESS NOTES
Progress Note      Patient Name: Asya Burris   Patient ID: [id number]   Sex: Female   YOB: 1945    Referring Provider: Kaylee FLORENCE    Visit Date: July 27, 2020    Provider: Codi Carey PA-C   Location: [facility]   Location Address: [address] [place] [address]  [id number]   Location Phone: [phone]          Chief Complaint     Patient is here to discuss MRI results.       History Of Present Illness  The patient is a 74 year old female who is in the office for followup appointment. MRI of Cspine showed facet arthritis at several levels. There is stenosis at C6/7 and C5/6. There is foraminal narrowing at several levels as well. MRI of Lspine showed facet arthritis at several levels with stenosis at L4/5. There is foraminal narrowing at several levels. She continues to have low back and neck pain. She notes that she is more worried her soon foot surgery.       Past Medical History  Lumbago         Past Surgical History  Hand surgery         Medication List  bupropion HCl 150 mg oral tablet sustained-release 12 hr; diclofenac sodium 75 mg oral tablet,delayed release (DR/EC); losartan-hydrochlorothiazide 100-12.5 mg oral tablet; omeprazole 40 mg oral capsule,delayed release(DR/EC); sertraline 100 mg oral tablet; trazodone 50 mg oral tablet         Allergy List  NO KNOWN DRUG ALLERGIES       Allergies Reconciled  Family Medical History  *No Known Family History         Social History  Tobacco (Never)         Review of Systems  · Constitutional  o Denies  o : chills, excessive sweating, fatigue, fever, sycope/passing out, weight gain, weight loss  · Eyes  o Denies  o : changes in vision, blurry vision, double vision  · HENT  o Denies  o : loss of hearing, ringing in the ears, ear aches, sore throat, nasal congestion, sinus pain, nose bleeds, seasonal allergies  · Cardiovascular  o Denies  o : blood clots, swollen legs, anemia, easy burising or bleeding,  "transfusions  · Respiratory  o Denies  o : shortness of breath, dry cough, productive cough, pneumonia, COPD  · Gastrointestinal  o Denies  o : difficulty swallowing, reflux  · Genitourinary  o Denies  o : incontinence  · Neurologic  o Denies  o : headache, seizure, stroke, tremor, loss of balance, falls, dizziness/vertigo, difficulty with sleep, numbness/tingling/paresthesia , difficulty with coordination, difficulty with dexterity, weakness  · Musculoskeletal  o Admits  o : joint pain, low back pain  o Denies  o : neck stiffness/pain, swollen lymph nodes, muscle aches, weakness, spasms, sciatica, pain radiating in arm, pain radiating in leg  · Endocrine  o Denies  o : diabetes, thyroid disorder  · Psychiatric  o Denies  o : anxiety, depression      Vitals  Date Time BP Position Site L\R Cuff Size HR RR TEMP (F) WT  HT  BMI kg/m2 BSA m2 O2 Sat        07/27/2020 03:11 PM        97.9 179lbs 0oz 5'  4\" 30.72 1.91           Physical Examination  · Constitutional  o Appearance  o : well-nourished, well developed, alert, in no acute distress  · Respiratory  o Respiratory Effort  o : breathing unlabored  · Cardiovascular  o Peripheral Vascular System  o :   § Extremities  § : no edema or cyanosis  · Musculoskeletal  o Spine  o :   § Inspection/Palpation  § : tenderness to palpation present in left lower back  o Right Lower Extremity  o :   § Inspection/Palpation  § : no joint or limb tenderness to palpation, no edema present, no ecchymosis  § Joint Stability  § : joint stability within normal limits  § Range of Motion  § : range of motion normal, no joint crepitations present, no pain on motion, Glen's test negative  o Left Lower Extremity  o :   § Inspection/Palpation  § : no joint or limb tenderness to palpation, no edema present, no ecchymosis  § Joint Stability  § : joint stability within normal limits  § Range of Motion  § : range of motion normal, no joint crepitations present, no pain on motion, Glen's test " negative  · Skin and Subcutaneous Tissue  o Extremities  o :   § Right Lower Extremity  § : no lesions or areas of discoloration  § Left Lower Extremity  § : no lesions or areas of discoloration  o Back  o : no lesions or areas of discoloration  · Neurologic  o Mental Status Examination  o :   § Orientation  § : alert and oriented to time, person, place and events  o Motor Examination  o :   § RLE Strength  § : strength normal  § RLE Motor Function  § : tone normal, no atrophy, no abnormal movements noted  § LLE Strength  § : strength normal  § LLE Motor Function  § : tone normal, no atrophy, no abnormal movements noted  o Reflexes  o :   § RLE  § : knee and ankle reflexes 2/4, SLR negative  § LLE  § : knee and ankle reflexes 2/4, SLR negative  o Sensation  o :   § Light Touch  § : sensation intact to light touch in extremities  o Gait and Station  o :   § Gait Screening  § : limping gait. Unsteady gait.   · Psychiatric  o Mood and Affect  o : mood normal, affect appropriate     Tenderness in neck.           Assessment  · Lumbago/low back pain     724.3/M54.40  · Sciatica     724.3/M54.30  · Cervicalgia     723.1/M54.2  · Cervical radiculopathy     723.4/M54.12      Plan  · Medications  o Medications have been Reconciled  o Transition of Care or Provider Policy  · Instructions  o The ROS and the PFSH were reviewed at today's visit.  o Call or return to office if symptoms worsen or persist.   o Will return to us as needed. She notes that she has a foot surgery planned for next week. She does not wish to have any treatment at this point. I suggested no intense activity with moderate cervical stenosis. She will return with any worsening pain/numbness/weakness.   o Electronically Identified Patient Education Materials Provided Electronically  · Disposition  o Call or Return if symptoms worsen or persist.            Electronically Signed by: Codi Carey PA-C -Author on July 27, 2020 04:02:19 PM

## 2021-05-13 NOTE — PROGRESS NOTES
Progress Note      Patient Name: Asya Burris   Patient ID: [id number]   Sex: Female   YOB: 1945    Referring Provider: Kaylee FLORENCE    Visit Date: October 28, 2020    Provider: Codi Carey PA-C   Location: [facility]   Location Address: [address] [place] [address]  [id number]   Location Phone: [phone]          Chief Complaint     Here for follow up.Complains of low back pain       History Of Present Illness     EMG of arms showed bilateral carpal tunnel syndrome. Pt notes that she has left hand pain and numbness in finger tips that radiates up arm to shoulder. She notes that she had carpal tunnel surgery on right several years ago and it helped with her symptoms. She notes that this pain is similar to the right hand.       Past Medical History  Lumbago         Past Surgical History  Hand surgery         Medication List  bupropion HCl 150 mg oral tablet sustained-release 12 hr; omeprazole 40 mg oral capsule,delayed release(DR/EC); sertraline 100 mg oral tablet; trazodone 50 mg oral tablet         Allergy List  NO KNOWN DRUG ALLERGIES         Family Medical History  Family history of Parkinson disease; Family history of lung disease         Social History  Tobacco (Never)         Review of Systems  · Constitutional  o Denies  o : fatigue, fever, headache, chills  · Eyes  o Denies  o : eye pain, double vision, blurred vision  · HENT  o Denies  o : headaches, sinus problems, sore throat, ear infection  · Cardiovascular  o Denies  o : chest pain, high blood pressure, varicosities  · Respiratory  o Admits  o : shortness of breath  o Denies  o : wheezing, frequent cough  · Gastrointestinal  o Denies  o : nausea, vomiting, heartburn, indigestion, abdominal pain  · Genitourinary  o Denies  o : urgency, frequency, urinary retention, painful urination  · Integument  o Denies  o : rash, itching, boils  · Neurologic  o Admits  o : tingling or  numbness  o Denies  o : tremors, dizzy spells  · Musculoskeletal  o Admits  o : neck pain, back pain  o Denies  o : joint pain  · Endocrine  o Denies  o : cold intolerance, heat intolerance, tired, excessive thirst, sluggish  · Psychiatric  o Admits  o : feels satisfied with life, severe depression  o Denies  o : anxiety, concerns with hurting themselves  · Heme-Lymph  o Denies  o : swollen glands, blood clotting problems  · Allergic-Immunologic  o Denies  o : sinus allergy symptoms, hay fever      Vitals  Date Time BP Position Site L\R Cuff Size HR RR TEMP (F) WT  HT  BMI kg/m2 BSA m2 O2 Sat FR L/min FiO2 HC       10/28/2020 12:50 PM        97.1           10/28/2020 01:18 /70 Sitting       170lbs 0oz 5'   33.2 1.81             Physical Examination  · Constitutional  o Appearance  o : well-nourished, well developed, alert, in no acute distress  · Respiratory  o Respiratory Effort  o : breathing unlabored  · Cardiovascular  o Peripheral Vascular System  o :   § Extremities  § : no edema or cyanosis  · Musculoskeletal  o Spine  o :   § Inspection/Palpation  § : tenderness to palpation present in left lower back  o Right Lower Extremity  o :   § Inspection/Palpation  § : no joint or limb tenderness to palpation, no edema present, no ecchymosis  § Joint Stability  § : joint stability within normal limits  § Range of Motion  § : range of motion normal, no joint crepitations present, no pain on motion, Glen's test negative  o Left Lower Extremity  o :   § Inspection/Palpation  § : no joint or limb tenderness to palpation, no edema present, no ecchymosis  § Joint Stability  § : joint stability within normal limits  § Range of Motion  § : range of motion normal, no joint crepitations present, no pain on motion, Glen's test negative  · Skin and Subcutaneous Tissue  o Extremities  o :   § Right Lower Extremity  § : no lesions or areas of discoloration  § Left Lower Extremity  § : no lesions or areas of  discoloration  o Back  o : no lesions or areas of discoloration  · Neurologic  o Mental Status Examination  o :   § Orientation  § : alert and oriented to time, person, place and events  o Motor Examination  o :   § RLE Strength  § : strength normal  § RLE Motor Function  § : tone normal, no atrophy, no abnormal movements noted  § LLE Strength  § : strength normal  § LLE Motor Function  § : tone normal, no atrophy, no abnormal movements noted  o Reflexes  o :   § RLE  § : knee and ankle reflexes 2/4, SLR negative  § LLE  § : knee and ankle reflexes 2/4, SLR negative  o Sensation  o :   § Light Touch  § : mild altered sensation in left middle 3 fingers.   o Gait and Station  o :   § Gait Screening  § : limping gait. Unsteady gait.   · Psychiatric  o Mood and Affect  o : mood normal, affect appropriate     Tenderness in neck. Arthritis noted in hands. Positive tinel's sign. Negative phalen's sign.           Assessment  · Lumbago/low back pain     724.3/M54.40  · Paresthesia     782.0/R20.2  · Sciatica     724.3/M54.30  · Cervicalgia     723.1/M54.2  · Cervical radiculopathy     723.4/M54.12      Plan  · Medications  o Medications have been Reconciled  o Transition of Care or Provider Policy  · Instructions  o Will have pt followup to discuss possible left CTR with Dr. MIKALA carver Electronically Identified Patient Education Materials Provided Electronically  · Disposition  o Call or Return if symptoms worsen or persist.            Electronically Signed by: Codi Carey PA-C -Author on October 28, 2020 01:56:33 PM

## 2021-05-13 NOTE — PROGRESS NOTES
Progress Note      Patient Name: Asya Burris   Patient ID: [id number]   Sex: Female   YOB: 1945    Referring Provider: Kaylee FLORENCE    Visit Date: September 2, 2020    Provider: Codi Carey PA-C   Location: [facility]   Location Address: [address] [place] [address]  [id number]   Location Phone: [phone]          Chief Complaint     PT HERE FOR FOLLOW UP FOR LUMBAGO, CERVICALGIA       History Of Present Illness     Pt notes that she has been having neck cramps and left hand numbness with right upper arm pain. She notes that her symptoms have worsened since the last time we had talked. The numbness in her left hand extends to her 3 middle fingers.       Past Medical History  Lumbago         Past Surgical History  Hand surgery         Medication List  bupropion HCl 150 mg oral tablet sustained-release 12 hr; losartan-hydrochlorothiazide 100-12.5 mg oral tablet; omeprazole 40 mg oral capsule,delayed release(DR/EC); sertraline 100 mg oral tablet; trazodone 50 mg oral tablet         Allergy List  NO KNOWN DRUG ALLERGIES       Allergies Reconciled  Family Medical History  *No Known Family History; Family history of Parkinson disease; Family history of lung disease         Social History  Tobacco (Never)         Review of Systems  · Constitutional  o Admits  o : fatigue, weight gain  o Denies  o : chills, excessive sweating, fever, sycope/passing out, weight loss  · Eyes  o Admits  o : changes in vision  o Denies  o : blurry vision, double vision  · HENT  o Admits  o : seasonal allergies  o Denies  o : loss of hearing, ringing in the ears, ear aches, sore throat, nasal congestion, sinus pain, nose bleeds  · Cardiovascular  o Denies  o : blood clots, swollen legs, anemia, easy burising or bleeding, transfusions  · Respiratory  o Admits  o : shortness of breath  o Denies  o : dry cough, productive cough, pneumonia,  COPD  · Gastrointestinal  o Admits  o : reflux  o Denies  o : difficulty swallowing  · Genitourinary  o Denies  o : incontinence  · Neurologic  o Admits  o : headache, loss of balance, numbness/tingling/paresthesia   o Denies  o : seizure, stroke, tremor, falls, dizziness/vertigo, difficulty with sleep, difficulty with coordination, difficulty with dexterity, weakness  · Musculoskeletal  o Admits  o : neck stiffness/pain, pain radiating in leg  o Denies  o : swollen lymph nodes, muscle aches, joint pain, weakness, spasms, sciatica, pain radiating in arm, low back pain  · Endocrine  o Denies  o : diabetes, thyroid disorder  · Psychiatric  o Admits  o : anxiety, depression      Vitals  Date Time BP Position Site L\R Cuff Size HR RR TEMP (F) WT  HT  BMI kg/m2 BSA m2 O2 Sat HC       09/02/2020 12:53 PM        97.1 170lbs 4oz 5'   33.25 1.81           Physical Examination  · Constitutional  o Appearance  o : well-nourished, well developed, alert, in no acute distress  · Respiratory  o Respiratory Effort  o : breathing unlabored  · Cardiovascular  o Peripheral Vascular System  o :   § Extremities  § : no edema or cyanosis  · Musculoskeletal  o Spine  o :   § Inspection/Palpation  § : tenderness to palpation present in left lower back  o Right Lower Extremity  o :   § Inspection/Palpation  § : no joint or limb tenderness to palpation, no edema present, no ecchymosis  § Joint Stability  § : joint stability within normal limits  § Range of Motion  § : range of motion normal, no joint crepitations present, no pain on motion, Glen's test negative  o Left Lower Extremity  o :   § Inspection/Palpation  § : no joint or limb tenderness to palpation, no edema present, no ecchymosis  § Joint Stability  § : joint stability within normal limits  § Range of Motion  § : range of motion normal, no joint crepitations present, no pain on motion, Glen's test negative  · Skin and Subcutaneous Tissue  o Extremities  o :   § Right Lower  Extremity  § : no lesions or areas of discoloration  § Left Lower Extremity  § : no lesions or areas of discoloration  o Back  o : no lesions or areas of discoloration  · Neurologic  o Mental Status Examination  o :   § Orientation  § : alert and oriented to time, person, place and events  o Motor Examination  o :   § RLE Strength  § : strength normal  § RLE Motor Function  § : tone normal, no atrophy, no abnormal movements noted  § LLE Strength  § : strength normal  § LLE Motor Function  § : tone normal, no atrophy, no abnormal movements noted  o Reflexes  o :   § RLE  § : knee and ankle reflexes 2/4, SLR negative  § LLE  § : knee and ankle reflexes 2/4, SLR negative  o Sensation  o :   § Light Touch  § : mild altered sensation in left middle 3 fingers.   o Gait and Station  o :   § Gait Screening  § : limping gait. Unsteady gait.   · Psychiatric  o Mood and Affect  o : mood normal, affect appropriate     Tenderness in neck. Arthritis noted in hands.           Assessment  · Lumbago/low back pain     724.3/M54.40  · Paresthesia     782.0/R20.2  · Sciatica     724.3/M54.30  · Cervicalgia     723.1/M54.2  · Cervical radiculopathy     723.4/M54.12    Problems Reconciled  Plan  · Orders  o EMG/NCV of Upper Extremities Bilaterally (36484) - 723.4/M54.12, 782.0/R20.2 - 09/02/2020  · Medications  o Medications have been Reconciled  o Transition of Care or Provider Policy  · Instructions  o Will order EMG of arms and return afterwards in 2 months. She will have foot surgery this week.   o Electronically Identified Patient Education Materials Provided Electronically  · Disposition  o Call or Return if symptoms worsen or persist.            Electronically Signed by: Codi Carey PA-C -Author on September 2, 2020 01:36:51 PM

## 2021-05-13 NOTE — PROGRESS NOTES
Progress Note      Patient Name: Asya Burris   Patient ID: [id number]   Sex: Female   YOB: 1945    Referring Provider: Kaylee FLORENCE    Visit Date: October 20, 2020    Provider: Zbigniew Galindo MD   Location: [facility]   Location Address: [address] [place] [address]  [id number]   Location Phone: [phone]          Chief Complaint     LUE numbness tingling and weakness       History Of Present Illness  Asya Burris is a 74 year old female who presents today to [facility] today referred from Kaylee FLORENCE.      74-year-old woman evaluated for left hand numbness and tingling that goes up her arm.  There is also cramping in her left arm.  Her daughter is with her today.  She drops glasses.  She had carpal tunnel surgery to the right hand in the past and the right hand is asymptomatic.  She is here for nerve conduction EMG study.  She had a cervical spine MRI showing moderate bilateral neuroforaminal stenosis at C5-C6, C6-C7.  There is no significant neuroforaminal narrowing at C7-T1.  She states that the numbness in the left hand is there all the time.  It does not come and go.  It involves 3 fingers usually the middle finger, ring finger and pinky.       Past Medical History  Lumbago         Past Surgical History  Hand surgery         Medication List  bupropion HCl 150 mg oral tablet sustained-release 12 hr; losartan-hydrochlorothiazide 100-12.5 mg oral tablet; omeprazole 40 mg oral capsule,delayed release(DR/EC); sertraline 100 mg oral tablet; trazodone 50 mg oral tablet         Allergy List  NO KNOWN DRUG ALLERGIES       Allergies Reconciled  Family Medical History  *No Known Family History; Family history of Parkinson disease; Family history of lung disease         Social History  Tobacco (Never)         Review of Systems  · Constitutional  o Admits  o : fatigue  o Denies  o : night sweats, weight gain  · Eyes  o Denies  o :  double vision, blurred vision  · HENT  o Denies  o : vertigo, recent head injury  · Breasts  o Denies  o : abnormal changes in breast size, additional breast symptoms except as noted in the HPI  · Cardiovascular  o Denies  o : chest pain, irregular heart beats  · Respiratory  o Admits  o : shortness of breath  o Denies  o : dry cough, productive cough  · Gastrointestinal  o Denies  o : nausea, vomiting  · Genitourinary  o Denies  o : dysuria, urinary retention  · Integument  o Denies  o : hair growth change, new skin lesions  · Neurologic  o Denies  o : altered mental status, tingling or numbness, seizures, loss of balance, additional neurologic symptoms except as noted in the HPI  · Musculoskeletal  o Denies  o : joint pain, joint swelling, muscle pain, limitation of motion, reviewed and non-contributory  · Endocrine  o Denies  o : cold intolerance, heat intolerance  · Heme-Lymph  o Denies  o : petechiae, lymph node enlargement or tenderness  · Allergic-Immunologic  o Denies  o : frequent illnesses      Vitals  Date Time BP Position Site L\R Cuff Size HR RR TEMP (F) WT  HT  BMI kg/m2 BSA m2 O2 Sat FR L/min FiO2 HC       10/20/2020 03:01 /81 Sitting    90 - R  96.7 169lbs 2oz 5'   33.03 1.8             Physical Examination     There is no weakness of the upper extremity individual muscle testing.  There is no weakness of intrinsic hand muscles.  Phalen sign is negative.           Assessment  · Carpal tunnel syndrome     354.0/G56.00  I discussed with the patient and her daughter that I would recommend for her to have carpal tunnel surgery to the left hand. I discussed with him that I do not think her symptoms are coming from her neck and therefore did not do an EMG study. I also discussed with him that EMG study is not going to be diagnostic for dorsal nerve root pathology. Her MRI of the cervical spine does not corroborate with her symptoms.    20 minutes was spent for this straightforward complexity medical  encounter more than half the time was spent face-to-face with the patient for examination, counseling, planning and recommendations  · Numbness and tingling       Anesthesia of skin     782.0/R20.0  Paresthesia of skin     782.0/R20.2  The study is abnormal shows electrophysiologic evidence for moderate carpal tunnel syndrome on the right and severe carpal tunnel syndrome on the left.       Plan  · Orders  o Nerve conduction studies; 5-6 studies (46667) - 782.0/R20.0, 782.0/R20.2, 354.0/G56.00 - 10/20/2020  · Medications  o Medications have been Reconciled  o Transition of Care or Provider Policy  · Instructions  o Encouraged to follow-up with Primary Care Provider for preventative care.            Electronically Signed by: Zbigniew Galindo MD -Author on October 20, 2020 03:57:43 PM

## 2021-05-14 VITALS
WEIGHT: 165 LBS | HEIGHT: 61 IN | HEART RATE: 92 BPM | DIASTOLIC BLOOD PRESSURE: 80 MMHG | SYSTOLIC BLOOD PRESSURE: 130 MMHG | BODY MASS INDEX: 31.15 KG/M2

## 2021-05-14 VITALS
BODY MASS INDEX: 33.2 KG/M2 | WEIGHT: 169.12 LBS | TEMPERATURE: 96.7 F | SYSTOLIC BLOOD PRESSURE: 123 MMHG | HEART RATE: 90 BPM | DIASTOLIC BLOOD PRESSURE: 81 MMHG | HEIGHT: 60 IN

## 2021-05-14 VITALS — HEIGHT: 60 IN | BODY MASS INDEX: 32.26 KG/M2 | WEIGHT: 164.31 LBS | TEMPERATURE: 96.7 F

## 2021-05-14 VITALS
TEMPERATURE: 97.1 F | HEIGHT: 60 IN | SYSTOLIC BLOOD PRESSURE: 126 MMHG | DIASTOLIC BLOOD PRESSURE: 70 MMHG | WEIGHT: 170 LBS | BODY MASS INDEX: 33.38 KG/M2

## 2021-05-14 VITALS — HEIGHT: 60 IN | TEMPERATURE: 97.1 F | BODY MASS INDEX: 33.42 KG/M2 | WEIGHT: 170.25 LBS

## 2021-05-14 NOTE — PROGRESS NOTES
Progress Note      Patient Name: Asya Burris   Patient ID: [id number]   Sex: Female   YOB: 1945    Referring Provider: Kaylee FLORENCE    Visit Date: December 22, 2020    Provider: Cristhian Hurst MD   Location: [facility]   Location Address: [address] [place] [address]  [id number]   Location Phone: [phone]          Chief Complaint     Here with complaints of left hand pain/numbness.       History Of Present Illness     She has locking in her fingers. She has bilateral CTS (mod-severe on the left and moderate on the right). She had a previous right carpal tunnel release. She has C5-6 and C6-7 stenosis (moderate). She has lower back pain and pain to the hip and thigh. She has moderate narrowing at L4-5 also.       Past Medical History  Lumbago         Past Surgical History  Hand surgery         Medication List  bupropion HCl 150 mg oral tablet sustained-release 12 hr; ferrous sulfate 325 mg (65 mg iron) oral tablet; omeprazole 40 mg oral capsule,delayed release(DR/EC); sertraline 100 mg oral tablet; trazodone 50 mg oral tablet         Allergy List  NO KNOWN DRUG ALLERGIES         Family Medical History  Family history of Parkinson disease; Family history of lung disease         Social History  Tobacco (Never)         Review of Systems  · Constitutional  o Denies  o : chills, excessive sweating, fatigue, fever, sycope/passing out, weight gain, weight loss  · Eyes  o Denies  o : changes in vision, blurry vision, double vision  · HENT  o Denies  o : loss of hearing, ringing in the ears, ear aches, sore throat, nasal congestion, sinus pain, nose bleeds, seasonal allergies  · Cardiovascular  o Denies  o : blood clots, swollen legs, anemia, easy burising or bleeding, transfusions  · Respiratory  o Denies  o : shortness of breath, dry cough, productive cough, pneumonia, COPD  · Gastrointestinal  o Denies  o : difficulty swallowing,  reflux  · Genitourinary  o Denies  o : incontinence  · Neurologic  o Admits  o : numbness/tingling/paresthesia   o Denies  o : headache, seizure, stroke, tremor, loss of balance, falls, dizziness/vertigo, difficulty with sleep, difficulty with coordination, difficulty with dexterity, weakness  · Musculoskeletal  o Denies  o : neck stiffness/pain, swollen lymph nodes, muscle aches, joint pain, weakness, spasms, sciatica, pain radiating in arm, pain radiating in leg, low back pain  · Endocrine  o Denies  o : diabetes, thyroid disorder  · Psychiatric  o Denies  o : anxiety, depression      Vitals  Date Time BP Position Site L\R Cuff Size HR RR TEMP (F) WT  HT  BMI kg/m2 BSA m2 O2 Sat FR L/min FiO2 HC       12/22/2020 08:59 AM        96.7 164lbs 5oz 5'   32.09 1.78             Physical Examination  · Constitutional  o Appearance  o : well-nourished, well developed, alert, in no acute distress  · Respiratory  o Respiratory Effort  o : breathing unlabored  · Cardiovascular  o Peripheral Vascular System  o :   § Extremities  § : no edema or cyanosis  · Psychiatric  o Mood and Affect  o : mood normal, affect appropriate     Arthritis noted in hands.               Assessment  · Lumbago/low back pain     724.3/M54.40  · Paresthesia     782.0/R20.2  · Sciatica     724.3/M54.30  · Cervicalgia     723.1/M54.2  · Cervical radiculopathy     723.4/M54.12  C5-6 and C6-7 stenosis  · Carpal tunnel syndrome     354.0/G56.00      Plan  · Medications  o Medications have been Reconciled  o Transition of Care or Provider Policy  · Instructions  o She will monitor the symptoms and if they get to the point she would desire surgery, she will call.            Electronically Signed by: Cristhian Hurst MD -Author on December 22, 2020 09:31:59 AM

## 2021-05-15 VITALS — TEMPERATURE: 97.9 F | WEIGHT: 179 LBS | BODY MASS INDEX: 30.56 KG/M2 | HEIGHT: 64 IN

## 2021-05-15 VITALS — TEMPERATURE: 97.5 F

## 2021-05-19 ENCOUNTER — HOSPITAL ENCOUNTER (OUTPATIENT)
Dept: OTHER | Facility: HOSPITAL | Age: 76
Discharge: HOME | End: 2021-05-19
Attending: INTERNAL MEDICINE

## 2021-05-19 LAB
ALBUMIN SERPL-MCNC: 4.3 G/DL (ref 3.5–5)
ALBUMIN/GLOB SERPL: 1.1 {RATIO} (ref 1.4–2.6)
ALP SERPL-CCNC: 63 U/L (ref 43–160)
ALT SERPL-CCNC: 17 U/L (ref 10–40)
ANION GAP SERPL CALC-SCNC: 18 MMOL/L (ref 8–19)
APPEARANCE UR: CLEAR
AST SERPL-CCNC: 27 U/L (ref 15–50)
BASOPHILS # BLD AUTO: 0.04 10*3/UL (ref 0–0.2)
BASOPHILS NFR BLD AUTO: 0.6 % (ref 0–3)
BILIRUB SERPL-MCNC: 0.41 MG/DL (ref 0.2–1.3)
BILIRUB UR QL: NEGATIVE
BUN SERPL-MCNC: 19 MG/DL (ref 5–25)
BUN/CREAT SERPL: 17 {RATIO} (ref 6–20)
CALCIUM SERPL-MCNC: 10.1 MG/DL (ref 8.7–10.4)
CHLORIDE SERPL-SCNC: 100 MMOL/L (ref 99–111)
COLOR UR: YELLOW
CONV ABS IMM GRAN: 0.01 10*3/UL (ref 0–0.2)
CONV CO2: 25 MMOL/L (ref 22–32)
CONV COLLECTION SOURCE (UA): NORMAL
CONV IMMATURE GRAN: 0.1 % (ref 0–1.8)
CONV TOTAL PROTEIN: 8.3 G/DL (ref 6.3–8.2)
CONV UROBILINOGEN IN URINE BY AUTOMATED TEST STRIP: 1 {EHRLICHU}/DL (ref 0.1–1)
CREAT UR-MCNC: 1.1 MG/DL (ref 0.5–0.9)
DEPRECATED RDW RBC AUTO: 45.6 FL (ref 36.4–46.3)
EOSINOPHIL # BLD AUTO: 0.12 10*3/UL (ref 0–0.7)
EOSINOPHIL # BLD AUTO: 1.7 % (ref 0–7)
ERYTHROCYTE [DISTWIDTH] IN BLOOD BY AUTOMATED COUNT: 15.1 % (ref 11.7–14.4)
GFR SERPLBLD BASED ON 1.73 SQ M-ARVRAT: 49 ML/MIN/{1.73_M2}
GLOBULIN UR ELPH-MCNC: 4 G/DL (ref 2–3.5)
GLUCOSE SERPL-MCNC: 93 MG/DL (ref 65–99)
GLUCOSE UR QL: NEGATIVE MG/DL
HCT VFR BLD AUTO: 39.8 % (ref 37–47)
HGB BLD-MCNC: 12 G/DL (ref 12–16)
HGB UR QL STRIP: NEGATIVE
KETONES UR QL STRIP: NEGATIVE MG/DL
LEUKOCYTE ESTERASE UR QL STRIP: NEGATIVE
LYMPHOCYTES # BLD AUTO: 2.2 10*3/UL (ref 1–5)
LYMPHOCYTES NFR BLD AUTO: 30.8 % (ref 20–45)
MCH RBC QN AUTO: 25 PG (ref 27–31)
MCHC RBC AUTO-ENTMCNC: 30.2 G/DL (ref 33–37)
MCV RBC AUTO: 82.9 FL (ref 81–99)
MONOCYTES # BLD AUTO: 0.49 10*3/UL (ref 0.2–1.2)
MONOCYTES NFR BLD AUTO: 6.9 % (ref 3–10)
NEUTROPHILS # BLD AUTO: 4.28 10*3/UL (ref 2–8)
NEUTROPHILS NFR BLD AUTO: 59.9 % (ref 30–85)
NITRITE UR QL STRIP: NEGATIVE
NRBC CBCN: 0 % (ref 0–0.7)
OSMOLALITY SERPL CALC.SUM OF ELEC: 290 MOSM/KG (ref 273–304)
PH UR STRIP.AUTO: 6.5 [PH] (ref 5–8)
PHOSPHATE SERPL-MCNC: 3.7 MG/DL (ref 2.4–4.5)
PLATELET # BLD AUTO: 247 10*3/UL (ref 130–400)
PMV BLD AUTO: 10.2 FL (ref 9.4–12.3)
POTASSIUM SERPL-SCNC: 4.1 MMOL/L (ref 3.5–5.3)
PROT UR QL: NEGATIVE MG/DL
PTH-INTACT SERPL-MCNC: 49 PG/ML (ref 11.1–79.5)
RBC # BLD AUTO: 4.8 10*6/UL (ref 4.2–5.4)
SODIUM SERPL-SCNC: 139 MMOL/L (ref 135–147)
SP GR UR: 1.01 (ref 1–1.03)
WBC # BLD AUTO: 7.14 10*3/UL (ref 4.8–10.8)

## 2021-06-02 ENCOUNTER — OFFICE VISIT CONVERTED (OUTPATIENT)
Dept: FAMILY MEDICINE CLINIC | Facility: CLINIC | Age: 76
End: 2021-06-02
Attending: NURSE PRACTITIONER

## 2021-06-02 ENCOUNTER — HOSPITAL ENCOUNTER (OUTPATIENT)
Dept: FAMILY MEDICINE CLINIC | Facility: CLINIC | Age: 76
Discharge: HOME | End: 2021-06-02
Attending: NURSE PRACTITIONER

## 2021-06-02 LAB
ALBUMIN SERPL-MCNC: 4.2 G/DL (ref 3.5–5)
ALBUMIN/GLOB SERPL: 1.1 {RATIO} (ref 1.4–2.6)
ALP SERPL-CCNC: 61 U/L (ref 43–160)
ALT SERPL-CCNC: 15 U/L (ref 10–40)
ANION GAP SERPL CALC-SCNC: 18 MMOL/L (ref 8–19)
APPEARANCE UR: CLEAR
AST SERPL-CCNC: 23 U/L (ref 15–50)
BASOPHILS # BLD AUTO: 0.05 10*3/UL (ref 0–0.2)
BASOPHILS NFR BLD AUTO: 0.7 % (ref 0–3)
BILIRUB SERPL-MCNC: 0.3 MG/DL (ref 0.2–1.3)
BILIRUB UR QL: NEGATIVE
BUN SERPL-MCNC: 18 MG/DL (ref 5–25)
BUN/CREAT SERPL: 18 {RATIO} (ref 6–20)
CALCIUM SERPL-MCNC: 10.2 MG/DL (ref 8.7–10.4)
CHLORIDE SERPL-SCNC: 101 MMOL/L (ref 99–111)
CHOLEST SERPL-MCNC: 236 MG/DL (ref 107–200)
CHOLEST/HDLC SERPL: 4.8 {RATIO} (ref 3–6)
COLOR UR: YELLOW
CONV ABS IMM GRAN: 0.01 10*3/UL (ref 0–0.2)
CONV CO2: 27 MMOL/L (ref 22–32)
CONV COLLECTION SOURCE (UA): NORMAL
CONV IMMATURE GRAN: 0.1 % (ref 0–1.8)
CONV TOTAL PROTEIN: 8.2 G/DL (ref 6.3–8.2)
CONV UROBILINOGEN IN URINE BY AUTOMATED TEST STRIP: 1 {EHRLICHU}/DL (ref 0.1–1)
CREAT UR-MCNC: 1.02 MG/DL (ref 0.5–0.9)
DEPRECATED RDW RBC AUTO: 45.9 FL (ref 36.4–46.3)
EOSINOPHIL # BLD AUTO: 0.12 10*3/UL (ref 0–0.7)
EOSINOPHIL # BLD AUTO: 1.7 % (ref 0–7)
ERYTHROCYTE [DISTWIDTH] IN BLOOD BY AUTOMATED COUNT: 14.8 % (ref 11.7–14.4)
FERRITIN SERPL-MCNC: 99 NG/ML (ref 10–200)
GFR SERPLBLD BASED ON 1.73 SQ M-ARVRAT: 54 ML/MIN/{1.73_M2}
GLOBULIN UR ELPH-MCNC: 4 G/DL (ref 2–3.5)
GLUCOSE SERPL-MCNC: 116 MG/DL (ref 65–99)
GLUCOSE UR QL: NEGATIVE MG/DL
HCT VFR BLD AUTO: 41.2 % (ref 37–47)
HDLC SERPL-MCNC: 49 MG/DL (ref 40–60)
HGB BLD-MCNC: 12.1 G/DL (ref 12–16)
HGB UR QL STRIP: NEGATIVE
IRON SATN MFR SERPL: 15 % (ref 20–55)
IRON SERPL-MCNC: 56 UG/DL (ref 60–170)
KETONES UR QL STRIP: NEGATIVE MG/DL
LDLC SERPL CALC-MCNC: 158 MG/DL (ref 70–100)
LEUKOCYTE ESTERASE UR QL STRIP: NEGATIVE
LYMPHOCYTES # BLD AUTO: 1.64 10*3/UL (ref 1–5)
LYMPHOCYTES NFR BLD AUTO: 23.3 % (ref 20–45)
MCH RBC QN AUTO: 25 PG (ref 27–31)
MCHC RBC AUTO-ENTMCNC: 29.4 G/DL (ref 33–37)
MCV RBC AUTO: 85.1 FL (ref 81–99)
MONOCYTES # BLD AUTO: 0.47 10*3/UL (ref 0.2–1.2)
MONOCYTES NFR BLD AUTO: 6.7 % (ref 3–10)
NEUTROPHILS # BLD AUTO: 4.76 10*3/UL (ref 2–8)
NEUTROPHILS NFR BLD AUTO: 67.5 % (ref 30–85)
NITRITE UR QL STRIP: NEGATIVE
NRBC CBCN: 0 % (ref 0–0.7)
OSMOLALITY SERPL CALC.SUM OF ELEC: 295 MOSM/KG (ref 273–304)
PH UR STRIP.AUTO: 6.5 [PH] (ref 5–8)
PLATELET # BLD AUTO: 251 10*3/UL (ref 130–400)
PMV BLD AUTO: 11.2 FL (ref 9.4–12.3)
POTASSIUM SERPL-SCNC: 4.6 MMOL/L (ref 3.5–5.3)
PROT UR QL: NEGATIVE MG/DL
RBC # BLD AUTO: 4.84 10*6/UL (ref 4.2–5.4)
SODIUM SERPL-SCNC: 141 MMOL/L (ref 135–147)
SP GR UR: 1.02 (ref 1–1.03)
TIBC SERPL-MCNC: 362 UG/DL (ref 245–450)
TRANSFERRIN SERPL-MCNC: 253 MG/DL (ref 250–380)
TRIGL SERPL-MCNC: 147 MG/DL (ref 40–150)
VLDLC SERPL-MCNC: 29 MG/DL (ref 5–37)
WBC # BLD AUTO: 7.05 10*3/UL (ref 4.8–10.8)

## 2021-06-05 NOTE — PROGRESS NOTES
Progress Note      Patient Name: Asya Burris   Patient ID: [id number]   Sex: Female   YOB: 1945        Visit Date: June 2, 2021    Provider: NAMAN Bermeo   Location: [facility]   Location Address: [address] [place] [address] [place]  [id number]   Location Phone: [phone]          Chief Complaint     PATIENT IS HERE FOR LAB WORK AND MEDICATION REFILLS.       History Of Present Illness  Asya Burris is a 75 year old /White female who presents for evaluation and treatment of:      refills of medication and fasting labs    HTN: stable in office - denies issues with medication    Anxiety with depression: stable according to pt and daughter - denies SI      GERD: denies use of additional antacids, unable to take calcium supplements due to kidneys    lots of mucous in the morning and coughing it up - does not take any allergy medication       Past Medical History  Disease Name Date Onset Notes   Anxiety disorder 12/11/2019 --    Arthritis --  --    Cataract --  --    Depression --  --    GERD (gastroesophageal reflux disease) --  --    Hyperlipidemia --  --    Hypertension, Benign Essential --  --    Migraines --  --    Sleep apnea --  --          Past Surgical History  Procedure Name Date Notes   Appendectomy --  --    Cataract surgery --  --    Foot surgery --  --    Hysterectomy (due to cancer) --  --          Medication List  Name Date Started Instructions   bupropion HCl 150 mg oral tablet extended release 24 hr 05/26/2021 take 1 tablet (150 mg) by oral route once daily for 90 days   losartan 100 mg oral tablet 05/26/2021 take 1 tablet (100 mg) by oral route once daily for 90 days   omeprazole 40 mg oral capsule,delayed release(/EC) 05/26/2021 take 1 capsule (40 mg) by oral route once daily before a meal for 90 days   sertraline 50 mg oral tablet 05/26/2021 take 1.5 tablets by oral route daily for 90 days   trazodone 50 mg oral tablet 05/26/2021 take 1  "tablet (50 mg) by oral route once daily at bedtime for 90 days         Allergy List  Allergen Name Date Reaction Notes   NO KNOWN DRUG ALLERGIES --  --  --        Allergies Reconciled  Family Medical History  Disease Name Relative/Age Notes   Asthma Brother/  Father/   Father; Brother   Arthrtis Mother/   Mother         Social History  Finding Status Start/Stop Quantity Notes   Alcohol Never --/-- --  never drinks alcohol   Exercises regularly --  --/-- --  1-2 times per week   Recreational Drug Use Never --/-- --  never used   Second hand smoke exposure Unknown --/-- --  no   Tobacco Never --/-- --  never smoker, never uses other tobacco products   Uses seatbelts --  --/-- --  yes         Immunizations  NameDate Admin Mfg Trade Name Lot Number Route Inj VIS Given VIS Publication   COVID [id number]/22/2021 NE Not Entered  NE NE 03/22/2021    Comments: elsewhere         Review of Systems  · Constitutional  o Denies  o : fever, chills, body aches  · Cardiovascular  o Admits  o : dyspnea on exertion  o Denies  o : chest pain, lower extremity edema, palpitations  · Respiratory  o Admits  o : cough, productive cough  o Denies  o : shortness of breath, wheezing      Vitals  Date Time BP Position Site L\R Cuff Size HR RR TEMP (F) WT  HT  BMI kg/m2 BSA m2 O2 Sat FR L/min FiO2        06/02/2021 08:15 /74 Sitting    90 - R  96.9 165lbs 0oz 5'  0.5\" 31.69 1.79 95 %            Physical Examination  · Constitutional  o Appearance  o : well developed, well-nourished, in no acute distress  · Eyes  o Conjunctivae  o : conjunctivae normal  o Pupils and Irises  o : pupils equal and round, pupils reactive to light bilaterally  · Neck  o Inspection/Palpation  o : supple  o Thyroid  o : no thyromegaly  · Respiratory  o Respiratory Effort  o : breathing unlabored  o Auscultation of Lungs  o : clear to ascultation  · Cardiovascular  o Heart  o :   § Auscultation of Heart  § : regular rate and rhythm  o Peripheral Vascular " System  o :   § Extremities  § : no edema  · Lymphatic  o Neck  o : no lymphadenopathy present  · Musculoskeletal  o General  o :   § General Musculoskeletal  § : Muscle tone, strength, and development grossly normal.  · Skin and Subcutaneous Tissue  o General Inspection  o : NL tone  · Neurologic  o Gait and Station  o :   § Gait Screening  § : normal gait  · Psychiatric  o Mood and Affect  o : mood normal, affect appropriate              Assessment  · Anxiety disorder     300.00/F41.9  · Essential hypertension     401.9/I10  · GERD (gastroesophageal reflux disease)     530.81/K21.9  · Depression     311/F32.9  · Iron deficiency     280.9/E61.1      Plan  · Orders  o HTN/Lipid Panel (CMP, Lipid) [facility] (96571, 76763) - 401.9/I10 - 06/02/2021  o CBC with Auto Diff [facility] (39964) - 401.9/I10 - 06/02/2021  o Urinalysis with Reflex Microscopy if abnormal ([facility]) (03954) - 401.9/I10 - 06/02/2021  o ACO-39: Current medications updated and reviewed (1159F, ) - - 06/02/2021  o Iron Profile (Iron 82900 TIBC 61719 and Transferrin 72660) (IRONP) - 280.9/E61.1 - 06/02/2021  o Ferritin ser/plas (55845) - 280.9/E61.1 - 06/02/2021  · Medications  o Medications have been Reconciled  o Transition of Care or Provider Policy  · Instructions  o Take all medications as prescribed/directed.  o Patient was educated/instructed on their diagnosis, treatment and medications prior to discharge from the clinic today.  · Disposition  o Follow up as needed.            Electronically Signed by: NAMAN Bermeo -Author on June 2, 2021 08:36:35 AM

## 2021-07-15 VITALS
DIASTOLIC BLOOD PRESSURE: 74 MMHG | HEIGHT: 60 IN | SYSTOLIC BLOOD PRESSURE: 122 MMHG | OXYGEN SATURATION: 95 % | HEART RATE: 90 BPM | TEMPERATURE: 96.9 F | WEIGHT: 165 LBS | BODY MASS INDEX: 32.39 KG/M2

## 2021-07-20 ENCOUNTER — TELEPHONE (OUTPATIENT)
Dept: NEUROLOGY | Facility: CLINIC | Age: 76
End: 2021-07-20

## 2021-07-20 NOTE — TELEPHONE ENCOUNTER
Caller: WERNER VELAZCO    Relationship: [employer]    Best call back number: [phone]    What was the call regarding: PT'S DAUGHTER CALLED REQUESTING TO SCHEDULE AN APPT FOR PT FOR WORSENING SYMPTOMS REGARDING HER CARPAL TUNNEL IN LEFT HAND. STATES PT HAS NOTICED INCREASED WEAKNESS AND NUMBNESS/TINGLING OF LEFT HAND, FOREARM. WOULD LIKE TO KNOW IF DR. VITAL WOULD LIKE TO SEE HER IN OFFICE FOR FURTHER EVALUATION?    Do you require a callback: YES, PLEASE.    PLEASE REVIEW AND ADVISE.

## 2021-07-20 NOTE — TELEPHONE ENCOUNTER
Pt is established with Dr. Hurst and in his last note he put the patient will call back if the patient desires surgery. Can you schedule

## 2021-07-20 NOTE — TELEPHONE ENCOUNTER
Left detailed voicemail to notify daughter that patient needs follow up appointment with Dr. Hurst.

## 2021-07-28 ENCOUNTER — OFFICE VISIT (OUTPATIENT)
Dept: NEUROSURGERY | Facility: CLINIC | Age: 76
End: 2021-07-28

## 2021-07-28 VITALS
WEIGHT: 163.3 LBS | SYSTOLIC BLOOD PRESSURE: 140 MMHG | HEIGHT: 60 IN | OXYGEN SATURATION: 98 % | DIASTOLIC BLOOD PRESSURE: 89 MMHG | HEART RATE: 90 BPM | BODY MASS INDEX: 32.06 KG/M2

## 2021-07-28 DIAGNOSIS — M47.812 CERVICAL SPONDYLOSIS WITHOUT MYELOPATHY: ICD-10-CM

## 2021-07-28 DIAGNOSIS — G56.02 CARPAL TUNNEL SYNDROME OF LEFT WRIST: Primary | ICD-10-CM

## 2021-07-28 PROCEDURE — 99213 OFFICE O/P EST LOW 20 MIN: CPT | Performed by: NURSE PRACTITIONER

## 2021-07-28 RX ORDER — OMEPRAZOLE 40 MG/1
CAPSULE, DELAYED RELEASE ORAL
COMMUNITY
End: 2021-12-03 | Stop reason: SDUPTHER

## 2021-07-28 RX ORDER — TRAZODONE HYDROCHLORIDE 50 MG/1
TABLET ORAL
COMMUNITY
Start: 2021-05-26 | End: 2021-12-03 | Stop reason: SDUPTHER

## 2021-07-28 RX ORDER — LOSARTAN POTASSIUM 100 MG/1
TABLET ORAL
COMMUNITY
Start: 2021-05-26 | End: 2021-12-03 | Stop reason: SDUPTHER

## 2021-07-28 RX ORDER — BUPROPION HYDROCHLORIDE 150 MG/1
TABLET ORAL
COMMUNITY
Start: 2021-05-26 | End: 2021-12-03 | Stop reason: SDUPTHER

## 2021-07-28 RX ORDER — SERTRALINE HYDROCHLORIDE 100 MG/1
TABLET, FILM COATED ORAL
COMMUNITY
End: 2021-12-03 | Stop reason: DRUGHIGH

## 2021-07-28 NOTE — PROGRESS NOTES
"Chief Complaint  Neck Pain and Follow-up (wants to discuss carpal tunnel)    Subjective          Asya Burris who is a 75 y.o. year old female who presents to [facility] for neck pain and CTS on the left.     Pt last saw Dr. Hurst in December, following NCV showing bilateral CTS, moderately severe on left and moderate on right. There was discussion at that time regarding CTR and she was told to call when ready for surgery. She is here today as she wishes to proceed with CTR.    She has c/o left hand weakness, loss of  strength and dexterity. Numbness in the hand radiating to forearm.     She continues to have chronic neck pain radiating into the shoulder. This is stable. Last MRI Cervical Spine one year ago showed moderate spinal stenosis at C5/6 and C6/7.      Interval History per Dr. Hurst 12/22/2020    She has locking in her fingers. She has bilateral CTS (mod-severe on the left and moderate on the right). She had a previous right carpal tunnel release. She has C5-6 and C6-7 stenosis (moderate). She has lower back pain and pain to the hip and thigh. She has moderate narrowing at L4-5 also.    Recent Interventions: None      Review of Systems   Musculoskeletal: Positive for arthralgias, back pain, joint swelling, myalgias, neck pain and neck stiffness.   Neurological: Positive for weakness and numbness.   All other systems reviewed and are negative.       Objective   Vital Signs:   /89   Pulse 90   Ht 152.4 cm (60\")   Wt 74.1 kg (163 lb 4.8 oz)   SpO2 98%   BMI 31.89 kg/m²       Physical Exam  Vitals reviewed.   Constitutional:       Appearance: Normal appearance.   Musculoskeletal:      Right wrist: Decreased range of motion.      Left wrist: Decreased range of motion.      Comments: Positive Tinel's bilaterally, neg Phalen's   Neurological:      Mental Status: She is alert and oriented to person, place, and time.      Gait: Gait is intact.      " Deep Tendon Reflexes: Strength normal.      Reflex Scores:       Tricep reflexes are 2+ on the right side and 2+ on the left side.       Bicep reflexes are 2+ on the right side and 2+ on the left side.       Brachioradialis reflexes are 2+ on the right side and 2+ on the left side.       Neurologic Exam     Mental Status   Oriented to person, place, and time.   Level of consciousness: alert    Motor Exam   Muscle bulk: normal  Overall muscle tone: normal    Strength   Strength 5/5 throughout.     Sensory Exam   Light touch normal.     Gait, Coordination, and Reflexes     Gait  Gait: normal    Reflexes   Right brachioradialis: 2+  Left brachioradialis: 2+  Right biceps: 2+  Left biceps: 2+  Right triceps: 2+  Left triceps: 2+  Right Miller: absent  Left Miller: absent       Result Review :                 Assessment and Plan    Diagnoses and all orders for this visit:    1. Carpal tunnel syndrome of left wrist (Primary)    2. Cervical spondylosis without myelopathy    Patient wishes to proceed with CTR on the left. She is currently scheduled for a right ankle surgery on August 6th. We discussed allowing this to heal prior to scheduling her surgery. She will call when she is ready to schedule. Follow up s/p CTR.    I spent 25 minutes caring for Asya on this date of service. This time includes time spent by me in the following activities:preparing for the visit, reviewing tests, obtaining and/or reviewing a separately obtained history, performing a medically appropriate examination and/or evaluation , counseling and educating the patient/family/caregiver, referring and communicating with other health care professionals  and documenting information in the medical record     Follow Up   No follow-ups on file.  Patient was given instructions and counseling regarding her condition or for health maintenance advice.     -Discuss with Dr. Hurst regarding Carpal Tunnel Surgery on the left  -Call when ready to schedule  surgery

## 2021-07-28 NOTE — PATIENT INSTRUCTIONS
-Discuss with Dr. Hurst regarding Carpal Tunnel Surgery on the left  -Call when ready to schedule surgery

## 2021-07-30 ENCOUNTER — TRANSCRIBE ORDERS (OUTPATIENT)
Dept: ADMINISTRATIVE | Facility: HOSPITAL | Age: 76
End: 2021-07-30

## 2021-07-30 ENCOUNTER — LAB (OUTPATIENT)
Dept: LAB | Facility: HOSPITAL | Age: 76
End: 2021-07-30

## 2021-07-30 ENCOUNTER — HOSPITAL ENCOUNTER (OUTPATIENT)
Dept: GENERAL RADIOLOGY | Facility: HOSPITAL | Age: 76
Discharge: HOME | End: 2021-07-30

## 2021-07-30 ENCOUNTER — HOSPITAL ENCOUNTER (OUTPATIENT)
Dept: CARDIOLOGY | Facility: HOSPITAL | Age: 76
Discharge: HOME | End: 2021-07-30

## 2021-07-30 DIAGNOSIS — M79.671 RIGHT FOOT PAIN: ICD-10-CM

## 2021-07-30 DIAGNOSIS — R26.2 CANNOT WALK: ICD-10-CM

## 2021-07-30 DIAGNOSIS — M79.673 PAIN OF FOOT, UNSPECIFIED LATERALITY: Primary | ICD-10-CM

## 2021-07-30 DIAGNOSIS — M79.671 RIGHT FOOT PAIN: Primary | ICD-10-CM

## 2021-07-30 DIAGNOSIS — M79.673 PAIN OF FOOT, UNSPECIFIED LATERALITY: ICD-10-CM

## 2021-07-30 LAB
ALBUMIN SERPL-MCNC: 4.6 G/DL (ref 3.5–5.2)
ALBUMIN/GLOB SERPL: 1.3 G/DL
ALP SERPL-CCNC: 69 U/L (ref 39–117)
ALT SERPL W P-5'-P-CCNC: 18 U/L (ref 1–33)
ANION GAP SERPL CALCULATED.3IONS-SCNC: 13.6 MMOL/L (ref 5–15)
AST SERPL-CCNC: 25 U/L (ref 1–32)
BASOPHILS # BLD AUTO: 0.05 10*3/MM3 (ref 0–0.2)
BASOPHILS NFR BLD AUTO: 0.7 % (ref 0–1.5)
BILIRUB SERPL-MCNC: 0.3 MG/DL (ref 0–1.2)
BUN SERPL-MCNC: 20 MG/DL (ref 8–23)
BUN/CREAT SERPL: 19.8 (ref 7–25)
CALCIUM SPEC-SCNC: 10.3 MG/DL (ref 8.6–10.5)
CHLORIDE SERPL-SCNC: 98 MMOL/L (ref 98–107)
CO2 SERPL-SCNC: 26.4 MMOL/L (ref 22–29)
CREAT SERPL-MCNC: 1.01 MG/DL (ref 0.57–1)
DEPRECATED RDW RBC AUTO: 45.1 FL (ref 37–54)
EOSINOPHIL # BLD AUTO: 0.11 10*3/MM3 (ref 0–0.4)
EOSINOPHIL NFR BLD AUTO: 1.6 % (ref 0.3–6.2)
ERYTHROCYTE [DISTWIDTH] IN BLOOD BY AUTOMATED COUNT: 14.6 % (ref 12.3–15.4)
GFR SERPL CREATININE-BSD FRML MDRD: 54 ML/MIN/1.73
GLOBULIN UR ELPH-MCNC: 3.5 GM/DL
GLUCOSE SERPL-MCNC: 120 MG/DL (ref 65–99)
HCT VFR BLD AUTO: 40.3 % (ref 34–46.6)
HGB BLD-MCNC: 12.1 G/DL (ref 12–15.9)
IMM GRANULOCYTES # BLD AUTO: 0.03 10*3/MM3 (ref 0–0.05)
IMM GRANULOCYTES NFR BLD AUTO: 0.4 % (ref 0–0.5)
INR PPP: 1.01 (ref 2–3)
LYMPHOCYTES # BLD AUTO: 1.79 10*3/MM3 (ref 0.7–3.1)
LYMPHOCYTES NFR BLD AUTO: 25.7 % (ref 19.6–45.3)
MCH RBC QN AUTO: 25.5 PG (ref 26.6–33)
MCHC RBC AUTO-ENTMCNC: 30 G/DL (ref 31.5–35.7)
MCV RBC AUTO: 85 FL (ref 79–97)
MONOCYTES # BLD AUTO: 0.45 10*3/MM3 (ref 0.1–0.9)
MONOCYTES NFR BLD AUTO: 6.5 % (ref 5–12)
NEUTROPHILS NFR BLD AUTO: 4.54 10*3/MM3 (ref 1.7–7)
NEUTROPHILS NFR BLD AUTO: 65.1 % (ref 42.7–76)
NRBC BLD AUTO-RTO: 0 /100 WBC (ref 0–0.2)
PLATELET # BLD AUTO: 259 10*3/MM3 (ref 140–450)
PMV BLD AUTO: 11.2 FL (ref 6–12)
POTASSIUM SERPL-SCNC: 3.5 MMOL/L (ref 3.5–5.2)
PROT SERPL-MCNC: 8.1 G/DL (ref 6–8.5)
PROTHROMBIN TIME: 11 SECONDS (ref 9.4–12)
RBC # BLD AUTO: 4.74 10*6/MM3 (ref 3.77–5.28)
SODIUM SERPL-SCNC: 138 MMOL/L (ref 136–145)
WBC # BLD AUTO: 6.97 10*3/MM3 (ref 3.4–10.8)

## 2021-07-30 PROCEDURE — 36415 COLL VENOUS BLD VENIPUNCTURE: CPT

## 2021-07-30 PROCEDURE — 71046 X-RAY EXAM CHEST 2 VIEWS: CPT

## 2021-07-30 PROCEDURE — 93005 ELECTROCARDIOGRAM TRACING: CPT | Performed by: PODIATRIST

## 2021-07-30 PROCEDURE — 93010 ELECTROCARDIOGRAM REPORT: CPT | Performed by: INTERNAL MEDICINE

## 2021-07-30 PROCEDURE — 85610 PROTHROMBIN TIME: CPT

## 2021-07-30 PROCEDURE — 82652 VIT D 1 25-DIHYDROXY: CPT

## 2021-07-30 PROCEDURE — 85025 COMPLETE CBC W/AUTO DIFF WBC: CPT

## 2021-07-30 PROCEDURE — 80053 COMPREHEN METABOLIC PANEL: CPT

## 2021-07-31 LAB — QT INTERVAL: 377 MS

## 2021-08-02 LAB — 1,25(OH)2D SERPL-MCNC: 40.9 PG/ML (ref 19.9–79.3)

## 2021-11-02 ENCOUNTER — OFFICE VISIT (OUTPATIENT)
Dept: FAMILY MEDICINE CLINIC | Facility: CLINIC | Age: 76
End: 2021-11-02

## 2021-11-02 VITALS
SYSTOLIC BLOOD PRESSURE: 130 MMHG | TEMPERATURE: 97.3 F | WEIGHT: 164.4 LBS | OXYGEN SATURATION: 95 % | BODY MASS INDEX: 32.28 KG/M2 | HEIGHT: 60 IN | HEART RATE: 83 BPM | DIASTOLIC BLOOD PRESSURE: 72 MMHG

## 2021-11-02 DIAGNOSIS — R10.9 RIGHT FLANK PAIN: ICD-10-CM

## 2021-11-02 DIAGNOSIS — M25.551 RIGHT HIP PAIN: ICD-10-CM

## 2021-11-02 DIAGNOSIS — R10.821 RIGHT UPPER QUADRANT ABDOMINAL TENDERNESS WITH REBOUND TENDERNESS: ICD-10-CM

## 2021-11-02 DIAGNOSIS — N28.9 ABNORMAL KIDNEY FUNCTION: Primary | ICD-10-CM

## 2021-11-02 PROBLEM — H26.9 CATARACT: Status: ACTIVE | Noted: 2021-11-02

## 2021-11-02 PROBLEM — G47.30 SLEEP APNEA: Status: ACTIVE | Noted: 2021-11-02

## 2021-11-02 PROBLEM — M54.50 LOW BACK PAIN: Status: ACTIVE | Noted: 2021-11-02

## 2021-11-02 PROBLEM — F32.A DEPRESSION: Status: ACTIVE | Noted: 2021-11-02

## 2021-11-02 PROBLEM — K21.9 GERD (GASTROESOPHAGEAL REFLUX DISEASE): Status: ACTIVE | Noted: 2021-11-02

## 2021-11-02 PROBLEM — F41.9 ANXIETY DISORDER: Status: ACTIVE | Noted: 2019-12-11

## 2021-11-02 PROBLEM — M19.90 ARTHRITIS: Status: ACTIVE | Noted: 2021-11-02

## 2021-11-02 PROBLEM — E78.5 HYPERLIPIDEMIA: Status: ACTIVE | Noted: 2021-11-02

## 2021-11-02 PROBLEM — I10 BENIGN ESSENTIAL HYPERTENSION: Status: ACTIVE | Noted: 2021-11-02

## 2021-11-02 PROBLEM — G43.909 MIGRAINES: Status: ACTIVE | Noted: 2021-11-02

## 2021-11-02 LAB
BILIRUB BLD-MCNC: NEGATIVE MG/DL
CLARITY, POC: CLEAR
COLOR UR: YELLOW
GLUCOSE UR STRIP-MCNC: NEGATIVE MG/DL
KETONES UR QL: NEGATIVE
LEUKOCYTE EST, POC: NEGATIVE
NITRITE UR-MCNC: NEGATIVE MG/ML
PH UR: 6 [PH] (ref 5–8)
PROT UR STRIP-MCNC: ABNORMAL MG/DL
RBC # UR STRIP: ABNORMAL /UL
SP GR UR: 1.02 (ref 1–1.03)
UROBILINOGEN UR QL: NORMAL

## 2021-11-02 PROCEDURE — 81002 URINALYSIS NONAUTO W/O SCOPE: CPT | Performed by: NURSE PRACTITIONER

## 2021-11-02 PROCEDURE — 99214 OFFICE O/P EST MOD 30 MIN: CPT | Performed by: NURSE PRACTITIONER

## 2021-11-02 NOTE — PROGRESS NOTES
Chief Complaint  Hip Pain (right hip pain, x a couple of months)    PHQ-2 Total Score: 0    Subjective        Past Medical History:   Diagnosis Date   • Anxiety    • Arthritis    • Cervical disc disorder    • CTS (carpal tunnel syndrome)    • GERD (gastroesophageal reflux disease)    • Hypertension    • Low back pain      Social History     Tobacco Use   • Smoking status: Never Smoker   • Smokeless tobacco: Never Used   Vaping Use   • Vaping Use: Never used   Substance Use Topics   • Alcohol use: Never   • Drug use: Never      Current Outpatient Medications on File Prior to Visit   Medication Sig   • buPROPion XL (WELLBUTRIN XL) 150 MG 24 hr tablet bupropion HCl 150 mg oral tablet extended release 24 hr take 1 tablet (150 mg) by oral route once daily for 90 days 5/26/2021  Active   • losartan (COZAAR) 100 MG tablet losartan 100 mg oral tablet take 1 tablet (100 mg) by oral route once daily for 90 days 5/26/2021  Active   • omeprazole (priLOSEC) 40 MG capsule omeprazole 40 mg oral capsule,delayed release(DR/EC) take 1 capsule (40 mg) by oral route once daily before a meal in combination with clarithromycin   Active   • sertraline (ZOLOFT) 50 MG tablet Take 50 mg by mouth Daily.   • traZODone (DESYREL) 50 MG tablet trazodone 50 mg oral tablet take 1 tablet (50 mg) by oral route once daily at bedtime for 90 days 5/26/2021  Active   • sertraline (ZOLOFT) 100 MG tablet sertraline 100 mg oral tablet take 1 tablet (100 mg) by oral route once daily   Active     No current facility-administered medications on file prior to visit.      No Known Allergies   Health Maintenance Due   Topic Date Due   • COLORECTAL CANCER SCREENING  Never done   • TDAP/TD VACCINES (1 - Tdap) Never done   • ZOSTER VACCINE (1 of 2) Never done   • Pneumococcal Vaccine 65+ (1 of 1 - PPSV23) Never done   • ANNUAL WELLNESS VISIT  Never done   • INFLUENZA VACCINE  Never done      Asya Burris presents to [facility]  Right hip pain x 2 months; pt states she had surgery on bilateral feet and was in a boot and states she fell onto the right hip about 2 months ago. Pain has been consistent. Pain is achy. Used a spray Biofreeze type medication. Cannot lie on the right side. Pain will worsen with a lot of walking.     Pt states she is having pain in the back more on the right side and sharp pain into the right groin/lower abdomen. And also notes seeing black spots in eyes. Last eye exam about 9 months ago.     As patient is leaving the room she asked about what could be causing her shortness of breath with going up the steps.  Patient states that she was seeing a specialist to mention doing a walking stress test but she had a boot and it was unable to be scheduled.  Discussed with patient that she should contact specialist to have stress test ordered.      Objective   Vital Signs:   /72 (BP Location: Right arm)   Pulse 83   Temp 97.3 °F (36.3 °C)   Ht 152.4 cm (60\")   Wt 74.6 kg (164 lb 6.4 oz)   SpO2 95%   BMI 32.11 kg/m²     Review of Systems   Physical Exam  Vitals reviewed.   Constitutional:       General: She is not in acute distress.     Appearance: Normal appearance. She is well-developed.   Eyes:      Conjunctiva/sclera: Conjunctivae normal.      Pupils: Pupils are equal, round, and reactive to light.   Cardiovascular:      Rate and Rhythm: Normal rate and regular rhythm.      Heart sounds: No murmur heard.      Pulmonary:      Effort: Pulmonary effort is normal.      Breath sounds: Normal breath sounds. No wheezing or rhonchi.   Abdominal:      Tenderness: There is abdominal tenderness (RUQ). There is right CVA tenderness.   Musculoskeletal:      Cervical back: Neck supple.      Right lower leg: No edema.      Left lower leg: No edema.      Comments: Gait with a limp.   Skin:     General: Skin is warm and dry.   Neurological:      Mental Status: She is alert and oriented to person, place, and time.      " Cranial Nerves: No cranial nerve deficit.   Psychiatric:         Mood and Affect: Mood and affect normal.         Behavior: Behavior normal.         Thought Content: Thought content normal.         Judgment: Judgment normal.        Result Review :   The following data was reviewed by: NAMAN New on 11/02/2021:  UA    Urinalysis 5/19/21 6/2/21 11/2/21   Specific [place], UA 1.015 1.021    Ketones, UA NEGATIVE NEGATIVE Negative   Blood, UA NEGATIVE NEGATIVE    Leukocytes, UA NEGATIVE NEGATIVE Negative   Nitrite, UA NEGATIVE NEGATIVE       Comments are available for some flowsheets but are not being displayed.                     Assessment and Plan    Diagnoses and all orders for this visit:    1. Abnormal kidney function (Primary)  -     POCT urinalysis dipstick, manual  -     Basic Metabolic Panel    2. Right flank pain  -     US Abdomen Limited    3. Right hip pain  -     XR Hip With or Without Pelvis 2 - 3 View Right    4. Right upper quadrant abdominal tenderness with rebound tenderness  -     US Abdomen Limited    Patient to go to the bridge of her diagnostic center in her earliest convenience to have BMP and right hip x-ray done.  Patient needed to leave office before they can be completed in office.    Discussed with patient that concerned that blood in urine could be due to renal stone.    Follow Up   Return if symptoms worsen or fail to improve.  Patient was given instructions and counseling regarding her condition or for health maintenance advice. Please see specific information pulled into the AVS if appropriate.

## 2021-11-03 DIAGNOSIS — R30.0 DYSURIA: Primary | ICD-10-CM

## 2021-11-03 PROCEDURE — 87086 URINE CULTURE/COLONY COUNT: CPT | Performed by: NURSE PRACTITIONER

## 2021-11-04 LAB — BACTERIA SPEC AEROBE CULT: NO GROWTH

## 2021-11-16 ENCOUNTER — TRANSCRIBE ORDERS (OUTPATIENT)
Dept: LAB | Facility: HOSPITAL | Age: 76
End: 2021-11-16

## 2021-11-16 ENCOUNTER — LAB (OUTPATIENT)
Dept: LAB | Facility: HOSPITAL | Age: 76
End: 2021-11-16

## 2021-11-16 ENCOUNTER — APPOINTMENT (OUTPATIENT)
Dept: ULTRASOUND IMAGING | Facility: HOSPITAL | Age: 76
End: 2021-11-16

## 2021-11-16 DIAGNOSIS — N18.31 CHRONIC KIDNEY DISEASE, STAGE 3A (HCC): ICD-10-CM

## 2021-11-16 DIAGNOSIS — E83.52 HYPERCALCEMIA: ICD-10-CM

## 2021-11-16 DIAGNOSIS — E83.52 HYPERCALCEMIA: Primary | ICD-10-CM

## 2021-11-16 LAB
25(OH)D3 SERPL-MCNC: 37.5 NG/ML
ALBUMIN SERPL-MCNC: 4.6 G/DL (ref 3.5–5.2)
ALBUMIN/GLOB SERPL: 1.2 G/DL
ALP SERPL-CCNC: 83 U/L (ref 39–117)
ALT SERPL W P-5'-P-CCNC: 14 U/L (ref 1–33)
ANION GAP SERPL CALCULATED.3IONS-SCNC: 10.8 MMOL/L (ref 5–15)
AST SERPL-CCNC: 27 U/L (ref 1–32)
BACTERIA UR QL AUTO: NORMAL /HPF
BASOPHILS # BLD AUTO: 0.06 10*3/MM3 (ref 0–0.2)
BASOPHILS NFR BLD AUTO: 0.7 % (ref 0–1.5)
BILIRUB SERPL-MCNC: 0.4 MG/DL (ref 0–1.2)
BILIRUB UR QL STRIP: NEGATIVE
BUN SERPL-MCNC: 19 MG/DL (ref 8–23)
BUN/CREAT SERPL: 18.6 (ref 7–25)
CALCIUM SPEC-SCNC: 10.4 MG/DL (ref 8.6–10.5)
CHLORIDE SERPL-SCNC: 100 MMOL/L (ref 98–107)
CLARITY UR: CLEAR
CO2 SERPL-SCNC: 27.2 MMOL/L (ref 22–29)
COLOR UR: YELLOW
CREAT SERPL-MCNC: 1.02 MG/DL (ref 0.57–1)
DEPRECATED RDW RBC AUTO: 40.5 FL (ref 37–54)
EOSINOPHIL # BLD AUTO: 0.1 10*3/MM3 (ref 0–0.4)
EOSINOPHIL NFR BLD AUTO: 1.2 % (ref 0.3–6.2)
ERYTHROCYTE [DISTWIDTH] IN BLOOD BY AUTOMATED COUNT: 14.1 % (ref 12.3–15.4)
GFR SERPL CREATININE-BSD FRML MDRD: 53 ML/MIN/1.73
GLOBULIN UR ELPH-MCNC: 3.7 GM/DL
GLUCOSE SERPL-MCNC: 88 MG/DL (ref 65–99)
GLUCOSE UR STRIP-MCNC: NEGATIVE MG/DL
HCT VFR BLD AUTO: 38.4 % (ref 34–46.6)
HGB BLD-MCNC: 12.5 G/DL (ref 12–15.9)
HGB UR QL STRIP.AUTO: NEGATIVE
HYALINE CASTS UR QL AUTO: NORMAL /LPF
IMM GRANULOCYTES # BLD AUTO: 0.02 10*3/MM3 (ref 0–0.05)
IMM GRANULOCYTES NFR BLD AUTO: 0.2 % (ref 0–0.5)
KETONES UR QL STRIP: NEGATIVE
LEUKOCYTE ESTERASE UR QL STRIP.AUTO: ABNORMAL
LYMPHOCYTES # BLD AUTO: 2.59 10*3/MM3 (ref 0.7–3.1)
LYMPHOCYTES NFR BLD AUTO: 30.5 % (ref 19.6–45.3)
MCH RBC QN AUTO: 26 PG (ref 26.6–33)
MCHC RBC AUTO-ENTMCNC: 32.6 G/DL (ref 31.5–35.7)
MCV RBC AUTO: 80 FL (ref 79–97)
MONOCYTES # BLD AUTO: 0.48 10*3/MM3 (ref 0.1–0.9)
MONOCYTES NFR BLD AUTO: 5.6 % (ref 5–12)
NEUTROPHILS NFR BLD AUTO: 5.25 10*3/MM3 (ref 1.7–7)
NEUTROPHILS NFR BLD AUTO: 61.8 % (ref 42.7–76)
NITRITE UR QL STRIP: NEGATIVE
NRBC BLD AUTO-RTO: 0 /100 WBC (ref 0–0.2)
PH UR STRIP.AUTO: 6.5 [PH] (ref 5–8)
PLATELET # BLD AUTO: 279 10*3/MM3 (ref 140–450)
PMV BLD AUTO: 11.4 FL (ref 6–12)
POTASSIUM SERPL-SCNC: 4 MMOL/L (ref 3.5–5.2)
PROT SERPL-MCNC: 8.3 G/DL (ref 6–8.5)
PROT UR QL STRIP: ABNORMAL
PTH-INTACT SERPL-MCNC: 36.8 PG/ML (ref 15–65)
RBC # BLD AUTO: 4.8 10*6/MM3 (ref 3.77–5.28)
RBC # UR: NORMAL /HPF
REF LAB TEST METHOD: NORMAL
SODIUM SERPL-SCNC: 138 MMOL/L (ref 136–145)
SP GR UR STRIP: 1.02 (ref 1–1.03)
SQUAMOUS #/AREA URNS HPF: NORMAL /HPF
UROBILINOGEN UR QL STRIP: ABNORMAL
WBC # BLD AUTO: 8.5 10*3/MM3 (ref 3.4–10.8)
WBC UR QL AUTO: NORMAL /HPF

## 2021-11-16 PROCEDURE — 80053 COMPREHEN METABOLIC PANEL: CPT

## 2021-11-16 PROCEDURE — 85025 COMPLETE CBC W/AUTO DIFF WBC: CPT

## 2021-11-16 PROCEDURE — 82306 VITAMIN D 25 HYDROXY: CPT

## 2021-11-16 PROCEDURE — 83970 ASSAY OF PARATHORMONE: CPT

## 2021-11-16 PROCEDURE — 81001 URINALYSIS AUTO W/SCOPE: CPT

## 2021-11-19 ENCOUNTER — HOSPITAL ENCOUNTER (OUTPATIENT)
Dept: ULTRASOUND IMAGING | Facility: HOSPITAL | Age: 76
Discharge: HOME | End: 2021-11-19

## 2021-11-19 ENCOUNTER — HOSPITAL ENCOUNTER (OUTPATIENT)
Dept: GENERAL RADIOLOGY | Facility: HOSPITAL | Age: 76
Discharge: HOME | End: 2021-11-19

## 2021-11-19 DIAGNOSIS — M25.551 RIGHT HIP PAIN: ICD-10-CM

## 2021-11-19 PROCEDURE — 76705 ECHO EXAM OF ABDOMEN: CPT

## 2021-11-19 PROCEDURE — 73502 X-RAY EXAM HIP UNI 2-3 VIEWS: CPT

## 2021-12-03 ENCOUNTER — OFFICE VISIT (OUTPATIENT)
Dept: FAMILY MEDICINE CLINIC | Facility: CLINIC | Age: 76
End: 2021-12-03

## 2021-12-03 ENCOUNTER — TELEPHONE (OUTPATIENT)
Dept: FAMILY MEDICINE CLINIC | Facility: CLINIC | Age: 76
End: 2021-12-03

## 2021-12-03 VITALS
HEART RATE: 88 BPM | HEIGHT: 60 IN | OXYGEN SATURATION: 97 % | DIASTOLIC BLOOD PRESSURE: 78 MMHG | TEMPERATURE: 96.9 F | BODY MASS INDEX: 32.79 KG/M2 | SYSTOLIC BLOOD PRESSURE: 138 MMHG | WEIGHT: 167 LBS

## 2021-12-03 DIAGNOSIS — I10 BENIGN ESSENTIAL HYPERTENSION: Primary | ICD-10-CM

## 2021-12-03 DIAGNOSIS — K21.9 GASTROESOPHAGEAL REFLUX DISEASE, UNSPECIFIED WHETHER ESOPHAGITIS PRESENT: ICD-10-CM

## 2021-12-03 DIAGNOSIS — R10.84 GENERALIZED ABDOMINAL PAIN: ICD-10-CM

## 2021-12-03 DIAGNOSIS — G47.00 INSOMNIA, UNSPECIFIED TYPE: ICD-10-CM

## 2021-12-03 DIAGNOSIS — M25.551 RIGHT HIP PAIN: ICD-10-CM

## 2021-12-03 DIAGNOSIS — F41.9 ANXIETY DISORDER, UNSPECIFIED TYPE: ICD-10-CM

## 2021-12-03 PROCEDURE — 99214 OFFICE O/P EST MOD 30 MIN: CPT | Performed by: NURSE PRACTITIONER

## 2021-12-03 RX ORDER — OMEPRAZOLE 40 MG/1
40 CAPSULE, DELAYED RELEASE ORAL DAILY
Qty: 90 CAPSULE | Refills: 1 | Status: SHIPPED | OUTPATIENT
Start: 2021-12-03 | End: 2022-04-25 | Stop reason: SDUPTHER

## 2021-12-03 RX ORDER — LOSARTAN POTASSIUM AND HYDROCHLOROTHIAZIDE 12.5; 1 MG/1; MG/1
1 TABLET ORAL DAILY
COMMUNITY
End: 2021-12-03 | Stop reason: ALTCHOICE

## 2021-12-03 RX ORDER — LOSARTAN POTASSIUM 100 MG/1
100 TABLET ORAL DAILY
Qty: 90 TABLET | Refills: 1 | Status: SHIPPED | OUTPATIENT
Start: 2021-12-03 | End: 2022-04-25 | Stop reason: SDUPTHER

## 2021-12-03 RX ORDER — BUPROPION HYDROCHLORIDE 150 MG/1
150 TABLET ORAL EVERY MORNING
Qty: 90 TABLET | Refills: 1 | Status: SHIPPED | OUTPATIENT
Start: 2021-12-03 | End: 2022-04-25 | Stop reason: SDUPTHER

## 2021-12-03 RX ORDER — TRAZODONE HYDROCHLORIDE 50 MG/1
50 TABLET ORAL NIGHTLY
Qty: 90 TABLET | Refills: 1 | Status: SHIPPED | OUTPATIENT
Start: 2021-12-03 | End: 2022-04-25 | Stop reason: SDUPTHER

## 2021-12-03 NOTE — PROGRESS NOTES
Chief Complaint  Follow-up (going over test results)    Subjective        Past Medical History:   Diagnosis Date   • Anxiety    • Arthritis    • Cervical disc disorder    • CTS (carpal tunnel syndrome)    • GERD (gastroesophageal reflux disease)    • Hypertension    • Low back pain      Social History     Tobacco Use   • Smoking status: Never Smoker   • Smokeless tobacco: Never Used   Vaping Use   • Vaping Use: Never used   Substance Use Topics   • Alcohol use: Never   • Drug use: Never      Current Outpatient Medications on File Prior to Visit   Medication Sig   • [DISCONTINUED] buPROPion XL (WELLBUTRIN XL) 150 MG 24 hr tablet bupropion HCl 150 mg oral tablet extended release 24 hr take 1 tablet (150 mg) by oral route once daily for 90 days 5/26/2021  Active   • [DISCONTINUED] losartan (COZAAR) 100 MG tablet losartan 100 mg oral tablet take 1 tablet (100 mg) by oral route once daily for 90 days 5/26/2021  Active   • [DISCONTINUED] losartan-hydrochlorothiazide (HYZAAR) 100-12.5 MG per tablet Take 1 tablet by mouth Daily.   • [DISCONTINUED] omeprazole (priLOSEC) 40 MG capsule omeprazole 40 mg oral capsule,delayed release(DR/EC) take 1 capsule (40 mg) by oral route once daily before a meal in combination with clarithromycin   Active   • [DISCONTINUED] sertraline (ZOLOFT) 100 MG tablet sertraline 100 mg oral tablet take 1 tablet (100 mg) by oral route once daily   Active   • [DISCONTINUED] sertraline (ZOLOFT) 50 MG tablet Take 50 mg by mouth Daily.   • [DISCONTINUED] traZODone (DESYREL) 50 MG tablet trazodone 50 mg oral tablet take 1 tablet (50 mg) by oral route once daily at bedtime for 90 days 5/26/2021  Active     No current facility-administered medications on file prior to visit.      No Known Allergies   Health Maintenance Due   Topic Date Due   • COLORECTAL CANCER SCREENING  Never done   • TDAP/TD VACCINES (1 - Tdap) Never done   • ZOSTER VACCINE (1 of 2) Never done   • Pneumococcal Vaccine 65+ (1 of 1 - PPSV23)  "Never done      Asya Burris presents to [facility]  Here to follow up on chronic health conditions and obtain refills. Pt recently had labs drawn for Nephrology.    HTN: stable in office. Will occassionally check BP at [facility].     Insomnia: trazodone helps her to fall asleep and stay asleep most nights.     Has some fluctuations in mood, but not daily. Admits to occasionally SI without plan.     Hip pain: Pt states she recently had surgery on feet and is waiting to have insoles made    Patient also ask about intermittent abdominal pain that she describes as a cramping/stabbing pain.  Denies constipation or diarrhea.      Objective   Vital Signs:   /78 (BP Location: Left arm, Patient Position: Sitting, Cuff Size: Adult)   Pulse 88   Temp 96.9 °F (36.1 °C) (Temporal)   Ht 152.4 cm (60\")   Wt 75.8 kg (167 lb)   SpO2 97%   BMI 32.61 kg/m²     Review of Systems   Respiratory: Positive for cough (worse in the morning with production) and shortness of breath (with exertion (walking up stairs or hill)). Negative for wheezing.    Cardiovascular: Negative for chest pain and palpitations.   Neurological: Positive for dizziness and headache. Negative for light-headedness.      Physical Exam  Vitals reviewed.   Constitutional:       General: She is not in acute distress.     Appearance: Normal appearance. She is well-developed.   HENT:      Head: Normocephalic and atraumatic.   Eyes:      Conjunctiva/sclera: Conjunctivae normal.      Pupils: Pupils are equal, round, and reactive to light.   Cardiovascular:      Rate and Rhythm: Normal rate and regular rhythm.      Heart sounds: Normal heart sounds.   Pulmonary:      Effort: Pulmonary effort is normal.      Breath sounds: Normal breath sounds. No wheezing or rhonchi.   Musculoskeletal:      Cervical back: Neck supple.      Right lower leg: No edema.      Left lower leg: No edema.   Skin:     General: Skin is warm and dry. "   Neurological:      Mental Status: She is alert and oriented to person, place, and time.      Cranial Nerves: No cranial nerve deficit.   Psychiatric:         Mood and Affect: Mood and affect normal.         Behavior: Behavior normal.         Thought Content: Thought content normal.         Judgment: Judgment normal.        Result Review :   The following data was reviewed by: NAMAN New on 12/03/2021:  CMP    CMP 6/2/21 7/30/21 11/16/21   Glucose 116 (A) 120 (A) 88   BUN 18 20 19   Creatinine 1.02 (A) 1.01 (A) 1.02 (A)   eGFR Non African Am  54 (A) 53 (A)   Sodium 141 138 138   Potassium 4.6 3.5 4.0   Chloride 101 98 100   Calcium 10.2 10.3 10.4   Albumin 4.2 4.60 4.60   Total Bilirubin 0.30 0.3 0.4   Alkaline Phosphatase 61 69 83   AST (SGOT) 23 25 27   ALT (SGPT) 15 18 14   (A) Abnormal value            TSH    TSH 12/11/20   TSH 1.930           BMP    BMP 6/2/21 7/30/21 11/16/21   BUN 18 20 19   Creatinine 1.02 (A) 1.01 (A) 1.02 (A)   Sodium 141 138 138   Potassium 4.6 3.5 4.0   Chloride 101 98 100   CO2 27 26.4 27.2   Calcium 10.2 10.3 10.4   (A) Abnormal value            Data reviewed: Radiologic studies Hip x-ray          Assessment and Plan    Diagnoses and all orders for this visit:    1. Benign essential hypertension (Primary)  -     losartan (COZAAR) 100 MG tablet; Take 1 tablet by mouth Daily.  Dispense: 90 tablet; Refill: 1    2. Anxiety disorder, unspecified type  -     sertraline (ZOLOFT) 50 MG tablet; Take 1.5 tablets by mouth Daily.  Dispense: 135 tablet; Refill: 1  -     buPROPion XL (WELLBUTRIN XL) 150 MG 24 hr tablet; Take 1 tablet by mouth Every Morning.  Dispense: 90 tablet; Refill: 1    3. Gastroesophageal reflux disease, unspecified whether esophagitis present  -     omeprazole (priLOSEC) 40 MG capsule; Take 1 capsule by mouth Daily.  Dispense: 90 capsule; Refill: 1    4. Insomnia, unspecified type  -     traZODone (DESYREL) 50 MG tablet; Take 1 tablet by mouth Every Night.   Dispense: 90 tablet; Refill: 1    5. Generalized abdominal pain    6. Right hip pain           Patient to monitor diet when symptoms occur and document to look for a trigger.  Discussed with patient to monitor bowel movements to see if constipation could be a contributing factor.  Patient to follow-up with continued pain.    Patient to have insoles made for shoes, then follow-up with continued hip pain or notify for referral to Ortho.    Follow Up   Return in about 6 months (around 6/3/2022) for Next scheduled follow up.  Patient was given instructions and counseling regarding her condition or for health maintenance advice. Please see specific information pulled into the AVS if appropriate.

## 2021-12-03 NOTE — TELEPHONE ENCOUNTER
Per [facility] pharmacy, sertraline dose that patient has had on 8/23/21 5/26/21 03/09/2021 and 12/11/2020 were for 50mg tablets take 1.5 daily. Prior to 12/2020, it was 50mg take 1 tablet daily. The increase to 1.5 tablets was since 12/2020

## 2022-03-16 ENCOUNTER — OFFICE VISIT (OUTPATIENT)
Dept: FAMILY MEDICINE CLINIC | Facility: CLINIC | Age: 77
End: 2022-03-16

## 2022-03-16 VITALS
BODY MASS INDEX: 32.98 KG/M2 | OXYGEN SATURATION: 95 % | WEIGHT: 168 LBS | TEMPERATURE: 97.3 F | SYSTOLIC BLOOD PRESSURE: 132 MMHG | DIASTOLIC BLOOD PRESSURE: 80 MMHG | HEART RATE: 114 BPM | HEIGHT: 60 IN

## 2022-03-16 DIAGNOSIS — R06.02 SHORTNESS OF BREATH: ICD-10-CM

## 2022-03-16 DIAGNOSIS — R10.30 LOWER ABDOMINAL PAIN: Primary | ICD-10-CM

## 2022-03-16 DIAGNOSIS — K59.00 CONSTIPATION, UNSPECIFIED CONSTIPATION TYPE: ICD-10-CM

## 2022-03-16 LAB
ALBUMIN SERPL-MCNC: 4.7 G/DL (ref 3.5–5.2)
ALBUMIN/GLOB SERPL: 1.3 G/DL
ALP SERPL-CCNC: 69 U/L (ref 39–117)
ALT SERPL W P-5'-P-CCNC: 22 U/L (ref 1–33)
ANION GAP SERPL CALCULATED.3IONS-SCNC: 11.6 MMOL/L (ref 5–15)
AST SERPL-CCNC: 33 U/L (ref 1–32)
BASOPHILS # BLD AUTO: 0.05 10*3/MM3 (ref 0–0.2)
BASOPHILS NFR BLD AUTO: 0.6 % (ref 0–1.5)
BILIRUB BLD-MCNC: NEGATIVE MG/DL
BILIRUB SERPL-MCNC: 0.4 MG/DL (ref 0–1.2)
BUN SERPL-MCNC: 24 MG/DL (ref 8–23)
BUN/CREAT SERPL: 20.7 (ref 7–25)
CALCIUM SPEC-SCNC: 10.9 MG/DL (ref 8.6–10.5)
CHLORIDE SERPL-SCNC: 99 MMOL/L (ref 98–107)
CLARITY, POC: CLEAR
CO2 SERPL-SCNC: 26.4 MMOL/L (ref 22–29)
COLOR UR: YELLOW
CREAT SERPL-MCNC: 1.16 MG/DL (ref 0.57–1)
DEPRECATED RDW RBC AUTO: 39.1 FL (ref 37–54)
EGFRCR SERPLBLD CKD-EPI 2021: 49.3 ML/MIN/1.73
EOSINOPHIL # BLD AUTO: 0.13 10*3/MM3 (ref 0–0.4)
EOSINOPHIL NFR BLD AUTO: 1.7 % (ref 0.3–6.2)
ERYTHROCYTE [DISTWIDTH] IN BLOOD BY AUTOMATED COUNT: 13.4 % (ref 12.3–15.4)
GLOBULIN UR ELPH-MCNC: 3.6 GM/DL
GLUCOSE SERPL-MCNC: 119 MG/DL (ref 65–99)
GLUCOSE UR STRIP-MCNC: NEGATIVE MG/DL
HCT VFR BLD AUTO: 41.9 % (ref 34–46.6)
HGB BLD-MCNC: 13.3 G/DL (ref 12–15.9)
IMM GRANULOCYTES # BLD AUTO: 0.03 10*3/MM3 (ref 0–0.05)
IMM GRANULOCYTES NFR BLD AUTO: 0.4 % (ref 0–0.5)
KETONES UR QL: NEGATIVE
LEUKOCYTE EST, POC: NEGATIVE
LYMPHOCYTES # BLD AUTO: 1.73 10*3/MM3 (ref 0.7–3.1)
LYMPHOCYTES NFR BLD AUTO: 22.4 % (ref 19.6–45.3)
MCH RBC QN AUTO: 25.7 PG (ref 26.6–33)
MCHC RBC AUTO-ENTMCNC: 31.7 G/DL (ref 31.5–35.7)
MCV RBC AUTO: 80.9 FL (ref 79–97)
MONOCYTES # BLD AUTO: 0.61 10*3/MM3 (ref 0.1–0.9)
MONOCYTES NFR BLD AUTO: 7.9 % (ref 5–12)
NEUTROPHILS NFR BLD AUTO: 5.17 10*3/MM3 (ref 1.7–7)
NEUTROPHILS NFR BLD AUTO: 67 % (ref 42.7–76)
NITRITE UR-MCNC: NEGATIVE MG/ML
NRBC BLD AUTO-RTO: 0 /100 WBC (ref 0–0.2)
NT-PROBNP SERPL-MCNC: 47.6 PG/ML (ref 0–1800)
PH UR: 5.5 [PH] (ref 5–8)
PLATELET # BLD AUTO: 262 10*3/MM3 (ref 140–450)
PMV BLD AUTO: 10.7 FL (ref 6–12)
POTASSIUM SERPL-SCNC: 4.5 MMOL/L (ref 3.5–5.2)
PROT SERPL-MCNC: 8.3 G/DL (ref 6–8.5)
PROT UR STRIP-MCNC: ABNORMAL MG/DL
RBC # BLD AUTO: 5.18 10*6/MM3 (ref 3.77–5.28)
RBC # UR STRIP: ABNORMAL /UL
SODIUM SERPL-SCNC: 137 MMOL/L (ref 136–145)
SP GR UR: 1.01 (ref 1–1.03)
UROBILINOGEN UR QL: NORMAL
WBC NRBC COR # BLD: 7.72 10*3/MM3 (ref 3.4–10.8)

## 2022-03-16 PROCEDURE — 81002 URINALYSIS NONAUTO W/O SCOPE: CPT | Performed by: NURSE PRACTITIONER

## 2022-03-16 PROCEDURE — 93000 ELECTROCARDIOGRAM COMPLETE: CPT | Performed by: NURSE PRACTITIONER

## 2022-03-16 PROCEDURE — 80053 COMPREHEN METABOLIC PANEL: CPT | Performed by: NURSE PRACTITIONER

## 2022-03-16 PROCEDURE — 83880 ASSAY OF NATRIURETIC PEPTIDE: CPT | Performed by: NURSE PRACTITIONER

## 2022-03-16 PROCEDURE — 36415 COLL VENOUS BLD VENIPUNCTURE: CPT | Performed by: NURSE PRACTITIONER

## 2022-03-16 PROCEDURE — 85025 COMPLETE CBC W/AUTO DIFF WBC: CPT | Performed by: NURSE PRACTITIONER

## 2022-03-16 PROCEDURE — 99214 OFFICE O/P EST MOD 30 MIN: CPT | Performed by: NURSE PRACTITIONER

## 2022-03-16 NOTE — PROGRESS NOTES
Venipuncture Blood Specimen Collection  Venipuncture performed in left arm   by Josephine Jacobs with good hemostasis. Patient tolerated the procedure well without complications.   03/16/22   Josephine Jacobs

## 2022-03-16 NOTE — PROGRESS NOTES
"Chief Complaint  Abdominal Pain (Abdominal discomfort, started about 1 month ago)    Subjective        Past Medical History:   Diagnosis Date   • Anxiety    • Arthritis    • Cervical disc disorder    • CTS (carpal tunnel syndrome)    • GERD (gastroesophageal reflux disease)    • Hypertension    • Low back pain      Social History     Tobacco Use   • Smoking status: Never Smoker   • Smokeless tobacco: Never Used   Vaping Use   • Vaping Use: Never used   Substance Use Topics   • Alcohol use: Never   • Drug use: Never      Current Outpatient Medications on File Prior to Visit   Medication Sig   • buPROPion XL (WELLBUTRIN XL) 150 MG 24 hr tablet Take 1 tablet by mouth Every Morning.   • losartan (COZAAR) 100 MG tablet Take 1 tablet by mouth Daily.   • omeprazole (priLOSEC) 40 MG capsule Take 1 capsule by mouth Daily.   • sertraline (ZOLOFT) 50 MG tablet Take 1.5 tablets by mouth Daily.   • traZODone (DESYREL) 50 MG tablet Take 1 tablet by mouth Every Night.     No current facility-administered medications on file prior to visit.      No Known Allergies   Health Maintenance Due   Topic Date Due   • COLORECTAL CANCER SCREENING  Never done   • TDAP/TD VACCINES (1 - Tdap) Never done   • ZOSTER VACCINE (1 of 2) Never done   • Pneumococcal Vaccine 65+ (1 of 1 - PPSV23) Never done      Asya Burris presents to [facility]  Here for middle-lower abdominal discomfort and low back discomfort x 1 month. Pt admits to urgency. Pt denies diarrhea, dysuria, frequency, constipation. No changes in diet.     Pt has hx of hysterectomy. Pt had appendix removed.     Pt also complains of continued SOB. Will have SOB with mild activity.       Objective   Vital Signs:   /80 (BP Location: Left arm)   Pulse 114   Temp 97.3 °F (36.3 °C)   Ht 152.4 cm (60\")   Wt 76.2 kg (168 lb)   SpO2 95%   BMI 32.81 kg/m²     Review of Systems   Physical Exam  Vitals reviewed.   Constitutional:       General: She " is not in acute distress.     Appearance: Normal appearance. She is well-developed.   HENT:      Head: Normocephalic and atraumatic.   Eyes:      Conjunctiva/sclera: Conjunctivae normal.      Pupils: Pupils are equal, round, and reactive to light.   Cardiovascular:      Rate and Rhythm: Normal rate and regular rhythm.      Heart sounds: Normal heart sounds.   Pulmonary:      Effort: Pulmonary effort is normal.      Breath sounds: Normal breath sounds.   Abdominal:      General: Abdomen is flat. There is no distension.      Palpations: Abdomen is soft. There is no mass.      Tenderness: There is generalized abdominal tenderness.   Musculoskeletal:      Cervical back: Neck supple.      Right lower leg: No edema.      Left lower leg: No edema.   Skin:     General: Skin is warm and dry.   Neurological:      Mental Status: She is alert and oriented to person, place, and time.   Psychiatric:         Mood and Affect: Mood and affect normal.         Behavior: Behavior normal.         Thought Content: Thought content normal.         Judgment: Judgment normal.        Result Review :   The following data was reviewed by: NAMAN New on 03/16/2022:  Common labs    Common Labsle 6/2/21 6/2/21 7/30/21 7/30/21 11/16/21 11/16/21    1200 1200 0855 0855 1404 1404   Glucose  116 (A)  120 (A)  88   BUN  18  20  19   Creatinine  1.02 (A)  1.01 (A)  1.02 (A)   eGFR Non African Am    54 (A)  53 (A)   Sodium  141  138  138   Potassium  4.6  3.5  4.0   Chloride  101  98  100   Calcium  10.2  10.3  10.4   Albumin  4.2  4.60  4.60   Total Bilirubin  0.30  0.3  0.4   Alkaline Phosphatase  61  69  83   AST (SGOT)  23  25  27   ALT (SGPT)  15  18  14   WBC 7.05  6.97  8.50    Hemoglobin 12.1  12.1  12.5    Hematocrit 41.2  40.3  38.4    Platelets 251  259  279    Total Cholesterol  236 (A)       Triglycerides  147       HDL Cholesterol  49       LDL Cholesterol   158 (A)       (A) Abnormal value       Comments are available for some  flowsheets but are not being displayed.           Data reviewed: Radiologic studies KUB     ECG 12 Lead    Date/Time: 3/16/2022 9:53 AM  Performed by: Kaylee Floyd APRN  Authorized by: Kaylee Floyd APRN   Comparison: compared with previous ECG from 7/30/2021  Rhythm: sinus rhythm  Rate: normal  Comments: borderline              Assessment and Plan    Diagnoses and all orders for this visit:    1. Lower abdominal pain (Primary)  -     POCT urinalysis dipstick, manual  -     XR Abdomen KUB (In Office)    2. Shortness of breath  -     ECG 12 Lead  -     CBC & Differential  -     Comprehensive Metabolic Panel  -     BNP    3. Constipation, unspecified constipation type    Recommend patient follow-up with cardiology for shortness of breath.  Recommend patient to take MiraLAX daily to help with constipation and see if that will help with abdominal discomfort.    Follow Up   Return if symptoms worsen or fail to improve.  Patient was given instructions and counseling regarding her condition or for health maintenance advice. Please see specific information pulled into the AVS if appropriate.

## 2022-03-24 ENCOUNTER — LAB (OUTPATIENT)
Dept: LAB | Facility: HOSPITAL | Age: 77
End: 2022-03-24

## 2022-03-24 ENCOUNTER — TRANSCRIBE ORDERS (OUTPATIENT)
Dept: ADMINISTRATIVE | Facility: HOSPITAL | Age: 77
End: 2022-03-24

## 2022-03-24 DIAGNOSIS — N18.31 CHRONIC KIDNEY DISEASE (CKD) STAGE G3A/A1, MODERATELY DECREASED GLOMERULAR FILTRATION RATE (GFR) BETWEEN 45-59 ML/MIN/1.73 SQUARE METER AND ALBUMINURIA CREATININE RATIO LESS THAN 30 MG/G (CMS/H*: ICD-10-CM

## 2022-03-24 DIAGNOSIS — N18.31 CHRONIC KIDNEY DISEASE (CKD) STAGE G3A/A1, MODERATELY DECREASED GLOMERULAR FILTRATION RATE (GFR) BETWEEN 45-59 ML/MIN/1.73 SQUARE METER AND ALBUMINURIA CREATININE RATIO LESS THAN 30 MG/G (CMS/H*: Primary | ICD-10-CM

## 2022-03-24 LAB
ALBUMIN SERPL-MCNC: 4.8 G/DL (ref 3.5–5.2)
ANION GAP SERPL CALCULATED.3IONS-SCNC: 13 MMOL/L (ref 5–15)
BASOPHILS # BLD AUTO: 0.05 10*3/MM3 (ref 0–0.2)
BASOPHILS NFR BLD AUTO: 0.7 % (ref 0–1.5)
BILIRUB UR QL STRIP: NEGATIVE
BUN SERPL-MCNC: 19 MG/DL (ref 8–23)
BUN/CREAT SERPL: 20.2 (ref 7–25)
CALCIUM SPEC-SCNC: 10.1 MG/DL (ref 8.6–10.5)
CHLORIDE SERPL-SCNC: 101 MMOL/L (ref 98–107)
CLARITY UR: ABNORMAL
CO2 SERPL-SCNC: 25 MMOL/L (ref 22–29)
COLOR UR: ABNORMAL
CREAT SERPL-MCNC: 0.94 MG/DL (ref 0.57–1)
CREAT UR-MCNC: 179.9 MG/DL
DEPRECATED RDW RBC AUTO: 40 FL (ref 37–54)
EGFRCR SERPLBLD CKD-EPI 2021: 63.4 ML/MIN/1.73
EOSINOPHIL # BLD AUTO: 0.11 10*3/MM3 (ref 0–0.4)
EOSINOPHIL NFR BLD AUTO: 1.6 % (ref 0.3–6.2)
ERYTHROCYTE [DISTWIDTH] IN BLOOD BY AUTOMATED COUNT: 13.8 % (ref 12.3–15.4)
GLUCOSE SERPL-MCNC: 141 MG/DL (ref 65–99)
GLUCOSE UR STRIP-MCNC: NEGATIVE MG/DL
HCT VFR BLD AUTO: 40 % (ref 34–46.6)
HGB BLD-MCNC: 12.5 G/DL (ref 12–15.9)
HGB UR QL STRIP.AUTO: NEGATIVE
IMM GRANULOCYTES # BLD AUTO: 0.02 10*3/MM3 (ref 0–0.05)
IMM GRANULOCYTES NFR BLD AUTO: 0.3 % (ref 0–0.5)
KETONES UR QL STRIP: ABNORMAL
LEUKOCYTE ESTERASE UR QL STRIP.AUTO: NEGATIVE
LYMPHOCYTES # BLD AUTO: 1.62 10*3/MM3 (ref 0.7–3.1)
LYMPHOCYTES NFR BLD AUTO: 23.5 % (ref 19.6–45.3)
MCH RBC QN AUTO: 25.7 PG (ref 26.6–33)
MCHC RBC AUTO-ENTMCNC: 31.3 G/DL (ref 31.5–35.7)
MCV RBC AUTO: 82.3 FL (ref 79–97)
MONOCYTES # BLD AUTO: 0.41 10*3/MM3 (ref 0.1–0.9)
MONOCYTES NFR BLD AUTO: 6 % (ref 5–12)
NEUTROPHILS NFR BLD AUTO: 4.68 10*3/MM3 (ref 1.7–7)
NEUTROPHILS NFR BLD AUTO: 67.9 % (ref 42.7–76)
NITRITE UR QL STRIP: NEGATIVE
NRBC BLD AUTO-RTO: 0 /100 WBC (ref 0–0.2)
PH UR STRIP.AUTO: 6 [PH] (ref 5–8)
PHOSPHATE SERPL-MCNC: 3 MG/DL (ref 2.5–4.5)
PLATELET # BLD AUTO: 244 10*3/MM3 (ref 140–450)
PMV BLD AUTO: 11.2 FL (ref 6–12)
POTASSIUM SERPL-SCNC: 4.4 MMOL/L (ref 3.5–5.2)
PROT ?TM UR-MCNC: 17 MG/DL
PROT UR QL STRIP: ABNORMAL
PROT/CREAT UR: 0.09 MG/G{CREAT}
RBC # BLD AUTO: 4.86 10*6/MM3 (ref 3.77–5.28)
SODIUM SERPL-SCNC: 139 MMOL/L (ref 136–145)
SP GR UR STRIP: 1.02 (ref 1–1.03)
UROBILINOGEN UR QL STRIP: ABNORMAL
WBC NRBC COR # BLD: 6.89 10*3/MM3 (ref 3.4–10.8)

## 2022-03-24 PROCEDURE — 80069 RENAL FUNCTION PANEL: CPT

## 2022-03-24 PROCEDURE — 84156 ASSAY OF PROTEIN URINE: CPT

## 2022-03-24 PROCEDURE — 81003 URINALYSIS AUTO W/O SCOPE: CPT

## 2022-03-24 PROCEDURE — 82570 ASSAY OF URINE CREATININE: CPT

## 2022-03-24 PROCEDURE — 36415 COLL VENOUS BLD VENIPUNCTURE: CPT

## 2022-03-24 PROCEDURE — 85025 COMPLETE CBC W/AUTO DIFF WBC: CPT

## 2022-04-25 ENCOUNTER — OFFICE VISIT (OUTPATIENT)
Dept: FAMILY MEDICINE CLINIC | Facility: CLINIC | Age: 77
End: 2022-04-25

## 2022-04-25 VITALS
DIASTOLIC BLOOD PRESSURE: 62 MMHG | BODY MASS INDEX: 31.92 KG/M2 | SYSTOLIC BLOOD PRESSURE: 128 MMHG | WEIGHT: 162.6 LBS | HEART RATE: 109 BPM | TEMPERATURE: 98 F | OXYGEN SATURATION: 95 % | HEIGHT: 60 IN

## 2022-04-25 DIAGNOSIS — G47.00 INSOMNIA, UNSPECIFIED TYPE: ICD-10-CM

## 2022-04-25 DIAGNOSIS — I10 BENIGN ESSENTIAL HYPERTENSION: Primary | ICD-10-CM

## 2022-04-25 DIAGNOSIS — F41.9 ANXIETY DISORDER, UNSPECIFIED TYPE: ICD-10-CM

## 2022-04-25 DIAGNOSIS — K21.9 GASTROESOPHAGEAL REFLUX DISEASE, UNSPECIFIED WHETHER ESOPHAGITIS PRESENT: ICD-10-CM

## 2022-04-25 DIAGNOSIS — Z12.31 ENCOUNTER FOR SCREENING MAMMOGRAM FOR MALIGNANT NEOPLASM OF BREAST: ICD-10-CM

## 2022-04-25 PROCEDURE — 99214 OFFICE O/P EST MOD 30 MIN: CPT | Performed by: NURSE PRACTITIONER

## 2022-04-25 RX ORDER — TRAZODONE HYDROCHLORIDE 50 MG/1
50 TABLET ORAL NIGHTLY
Qty: 90 TABLET | Refills: 1 | Status: SHIPPED | OUTPATIENT
Start: 2022-04-25 | End: 2022-10-21 | Stop reason: SDUPTHER

## 2022-04-25 RX ORDER — BUPROPION HYDROCHLORIDE 150 MG/1
150 TABLET ORAL EVERY MORNING
Qty: 90 TABLET | Refills: 1 | Status: SHIPPED | OUTPATIENT
Start: 2022-04-25 | End: 2022-10-21 | Stop reason: SDUPTHER

## 2022-04-25 RX ORDER — LOSARTAN POTASSIUM 100 MG/1
100 TABLET ORAL DAILY
Qty: 90 TABLET | Refills: 1 | Status: SHIPPED | OUTPATIENT
Start: 2022-04-25 | End: 2022-10-21 | Stop reason: SDUPTHER

## 2022-04-25 RX ORDER — OMEPRAZOLE 40 MG/1
40 CAPSULE, DELAYED RELEASE ORAL DAILY
Qty: 90 CAPSULE | Refills: 1 | Status: SHIPPED | OUTPATIENT
Start: 2022-04-25 | End: 2022-10-21 | Stop reason: SDUPTHER

## 2022-04-25 NOTE — PROGRESS NOTES
Chief Complaint  Constipation (Follow up, has improved with miralax)    Subjective        Past Medical History:   Diagnosis Date   • Anxiety    • Arthritis    • Cervical disc disorder    • CTS (carpal tunnel syndrome)    • GERD (gastroesophageal reflux disease)    • Hypertension    • Low back pain      Social History     Tobacco Use   • Smoking status: Never Smoker   • Smokeless tobacco: Never Used   Vaping Use   • Vaping Use: Never used   Substance Use Topics   • Alcohol use: Never   • Drug use: Never      Current Outpatient Medications on File Prior to Visit   Medication Sig   • [DISCONTINUED] buPROPion XL (WELLBUTRIN XL) 150 MG 24 hr tablet Take 1 tablet by mouth Every Morning.   • [DISCONTINUED] losartan (COZAAR) 100 MG tablet Take 1 tablet by mouth Daily.   • [DISCONTINUED] omeprazole (priLOSEC) 40 MG capsule Take 1 capsule by mouth Daily.   • [DISCONTINUED] sertraline (ZOLOFT) 50 MG tablet Take 1.5 tablets by mouth Daily.   • [DISCONTINUED] traZODone (DESYREL) 50 MG tablet Take 1 tablet by mouth Every Night.     No current facility-administered medications on file prior to visit.      No Known Allergies   Health Maintenance Due   Topic Date Due   • COLORECTAL CANCER SCREENING  Never done   • TDAP/TD VACCINES (1 - Tdap) Never done   • ZOSTER VACCINE (1 of 2) Never done   • Pneumococcal Vaccine 65+ (1 - PCV) Never done   • ANNUAL WELLNESS VISIT  03/22/2022      Asya Burris presents to [facility]  Here to follow up on constipation. She has been taking Miralax daily. She is having small bowel movement daily and soft. Will occasionally have some lower abdominal pain. Will occasionally have some bloating.     Pt states she was supposed to have a stress test but had to cancel due to foot operation.     She would also like an order for a mammogram. Pt has a hx of cancer, last mammogram 4/2021 was benign.     Last colonoscopy about 6-7 years ago and pt states it was normal. Done in  "[place].     Patient states she is doing well on current medications and needs a refill and she will run out of medications before she returns from a trip to [place].  Patient is not sure how long she will be gone, her sister has Parkinson's and her brother recently passed away and she wants to go home for a visit.      Objective   Vital Signs:   /62 (BP Location: Left arm, Patient Position: Sitting, Cuff Size: Adult)   Pulse 109   Temp 98 °F (36.7 °C) (Temporal)   Ht 152.4 cm (60\")   Wt 73.8 kg (162 lb 9.6 oz)   SpO2 95%   BMI 31.76 kg/m²     Review of Systems   Physical Exam  Vitals reviewed.   Constitutional:       General: She is not in acute distress.     Appearance: Normal appearance. She is well-developed.   HENT:      Head: Normocephalic and atraumatic.   Eyes:      Conjunctiva/sclera: Conjunctivae normal.      Pupils: Pupils are equal, round, and reactive to light.   Cardiovascular:      Rate and Rhythm: Normal rate and regular rhythm.      Heart sounds: Normal heart sounds.   Pulmonary:      Effort: Pulmonary effort is normal.      Breath sounds: Normal breath sounds.   Abdominal:      Palpations: Abdomen is soft.      Tenderness: There is no abdominal tenderness.   Skin:     General: Skin is warm and dry.   Neurological:      Mental Status: She is alert and oriented to person, place, and time.   Psychiatric:         Mood and Affect: Mood and affect normal.         Behavior: Behavior normal.         Thought Content: Thought content normal.         Judgment: Judgment normal.        Result Review :   The following data was reviewed by: NAMAN New on 04/25/2022:  CMP    CMP 11/16/21 3/16/22 3/24/22   Glucose 88 119 (A) 141 (A)   BUN 19 24 (A) 19   Creatinine 1.02 (A) 1.16 (A) 0.94   eGFR Non African Am 53 (A)     Sodium 138 137 139   Potassium 4.0 4.5 4.4   Chloride 100 99 101   Calcium 10.4 10.9 (A) 10.1   Albumin 4.60 4.70 4.80   Total Bilirubin 0.4 0.4    Alkaline Phosphatase 83 69  "   AST (SGOT) 27 33 (A)    ALT (SGPT) 14 22    (A) Abnormal value       Comments are available for some flowsheets but are not being displayed.           BMP    BMP 11/16/21 3/16/22 3/24/22   BUN 19 24 (A) 19   Creatinine 1.02 (A) 1.16 (A) 0.94   Sodium 138 137 139   Potassium 4.0 4.5 4.4   Chloride 100 99 101   CO2 27.2 26.4 25.0   Calcium 10.4 10.9 (A) 10.1   (A) Abnormal value       Comments are available for some flowsheets but are not being displayed.                     Assessment and Plan    Diagnoses and all orders for this visit:    1. Benign essential hypertension (Primary)  -     losartan (COZAAR) 100 MG tablet; Take 1 tablet by mouth Daily.  Dispense: 90 tablet; Refill: 1    2. Gastroesophageal reflux disease, unspecified whether esophagitis present  -     omeprazole (priLOSEC) 40 MG capsule; Take 1 capsule by mouth Daily.  Dispense: 90 capsule; Refill: 1    3. Anxiety disorder, unspecified type  -     sertraline (ZOLOFT) 50 MG tablet; Take 1.5 tablets by mouth Daily.  Dispense: 135 tablet; Refill: 1  -     buPROPion XL (WELLBUTRIN XL) 150 MG 24 hr tablet; Take 1 tablet by mouth Every Morning.  Dispense: 90 tablet; Refill: 1    4. Insomnia, unspecified type  -     traZODone (DESYREL) 50 MG tablet; Take 1 tablet by mouth Every Night.  Dispense: 90 tablet; Refill: 1    5. Encounter for screening mammogram for malignant neoplasm of breast  -     Mammo Screening Digital Tomosynthesis Bilateral With CAD; Future    Patient may decrease MiraLAX to every other day to see if she gets a more formed stool but still regular bowel movements.    Patient has an appointment scheduled with Dr. Valdivia on May 4, recommend patient to discuss stress test with cardiologist.    Follow Up   Return in about 6 months (around 10/25/2022) for Refills and fasting labs.  Patient was given instructions and counseling regarding her condition or for health maintenance advice. Please see specific information pulled into the AVS if  appropriate.

## 2022-04-26 ENCOUNTER — TRANSCRIBE ORDERS (OUTPATIENT)
Dept: ADMINISTRATIVE | Facility: HOSPITAL | Age: 77
End: 2022-04-26

## 2022-04-26 DIAGNOSIS — Z12.31 VISIT FOR SCREENING MAMMOGRAM: Primary | ICD-10-CM

## 2022-05-04 ENCOUNTER — OFFICE VISIT (OUTPATIENT)
Dept: CARDIOLOGY | Facility: CLINIC | Age: 77
End: 2022-05-04

## 2022-05-04 VITALS
HEIGHT: 61 IN | BODY MASS INDEX: 30.58 KG/M2 | HEART RATE: 90 BPM | WEIGHT: 162 LBS | SYSTOLIC BLOOD PRESSURE: 139 MMHG | DIASTOLIC BLOOD PRESSURE: 94 MMHG

## 2022-05-04 DIAGNOSIS — R06.09 DOE (DYSPNEA ON EXERTION): Primary | ICD-10-CM

## 2022-05-04 DIAGNOSIS — I10 HYPERTENSION, ESSENTIAL: ICD-10-CM

## 2022-05-04 PROCEDURE — 99214 OFFICE O/P EST MOD 30 MIN: CPT | Performed by: INTERNAL MEDICINE

## 2022-05-04 NOTE — PROGRESS NOTES
Chief Complaint  Shortness of Breath    Subjective            Asya Burris presents to [facility]  History of Present Illness    Asya is here for follow-up evaluation management of shortness of breath, and hypertension.  She reports shortness of breath, with exertion.  This is chronic.  It is slightly worse over the last year.  She had a cardiac work-up last year including echocardiogram with no significant structural abnormalities, and nuclear stress test which was negative for ischemia.  She denies significant chest pain except random twinges of brief discomfort.  This is not exertional.  She is a non-smoker.    PMH  Past Medical History:   Diagnosis Date   • Anxiety    • Arthritis    • Cervical disc disorder    • CTS (carpal tunnel syndrome)    • GERD (gastroesophageal reflux disease)    • Hypertension    • Low back pain          SURGICALHX  Past Surgical History:   Procedure Laterality Date   • APPENDECTOMY     • CARPAL TUNNEL RELEASE      right side   • FOOT SURGERY     • HYSTERECTOMY          SOC  Social History     Socioeconomic History   • Marital status:    Tobacco Use   • Smoking status: Never Smoker   • Smokeless tobacco: Never Used   Vaping Use   • Vaping Use: Never used   Substance and Sexual Activity   • Alcohol use: Never   • Drug use: Never   • Sexual activity: Defer         FAMHX  Family History   Problem Relation Age of Onset   • Heart disease Mother    • COPD Father           ALLERGY  No Known Allergies     MEDSCURRENT    Current Outpatient Medications:   •  buPROPion XL (WELLBUTRIN XL) 150 MG 24 hr tablet, Take 1 tablet by mouth Every Morning., Disp: 90 tablet, Rfl: 1  •  losartan (COZAAR) 100 MG tablet, Take 1 tablet by mouth Daily., Disp: 90 tablet, Rfl: 1  •  omeprazole (priLOSEC) 40 MG capsule, Take 1 capsule by mouth Daily., Disp: 90 capsule, Rfl: 1  •  sertraline (ZOLOFT) 50 MG tablet, Take 1.5 tablets by mouth Daily., Disp: 135 tablet, Rfl: 1  •   "traZODone (DESYREL) 50 MG tablet, Take 1 tablet by mouth Every Night., Disp: 90 tablet, Rfl: 1      Review of Systems   Constitutional: Negative.   HENT: Negative.    Eyes: Negative.    Cardiovascular: Positive for dyspnea on exertion. Negative for chest pain.   Respiratory: Positive for shortness of breath.    Endocrine: Negative.    Hematologic/Lymphatic: Negative.    Skin: Negative.    Musculoskeletal: Negative.    Gastrointestinal: Negative.    Genitourinary: Negative.    Neurological: Negative.    Psychiatric/Behavioral: Negative.         Objective     /94   Pulse 90   Ht 154.9 cm (61\")   Wt 73.5 kg (162 lb)   BMI 30.61 kg/m²       General Appearance:   · well developed  · well nourished  HENT:   · oropharynx moist  · lips not cyanotic  Neck:  · thyroid not enlarged  · supple  Respiratory:  · no respiratory distress  · normal breath sounds  · no rales  Cardiovascular:  · no jugular venous distention  · regular rhythm  · apical impulse normal  · S1 normal, S2 normal  · no S3, no S4   · no murmur  · no rub, no thrill  · carotid pulses normal; no bruit  · pedal pulses normal  · lower extremity edema: none    Musculoskeletal:  · no clubbing of fingers.   · normocephalic, head atraumatic  Skin:   · warm, dry  Psychiatric:  · judgement and insight appropriate  · normal mood and affect      Result Review :     The following data was reviewed by: Mario Valdivia MD on 05/04/2022:    CMP    CMP 11/16/21 3/16/22 3/24/22   Glucose 88 119 (A) 141 (A)   BUN 19 24 (A) 19   Creatinine 1.02 (A) 1.16 (A) 0.94   eGFR Non African Am 53 (A)     Sodium 138 137 139   Potassium 4.0 4.5 4.4   Chloride 100 99 101   Calcium 10.4 10.9 (A) 10.1   Albumin 4.60 4.70 4.80   Total Bilirubin 0.4 0.4    Alkaline Phosphatase 83 69    AST (SGOT) 27 33 (A)    ALT (SGPT) 14 22    (A) Abnormal value       Comments are available for some flowsheets but are not being displayed.           CBC    CBC 11/16/21 3/16/22 3/24/22   WBC " 8.50 7.72 6.89   RBC 4.80 5.18 4.86   Hemoglobin 12.5 13.3 12.5   Hematocrit 38.4 41.9 40.0   MCV 80.0 80.9 82.3   MCH 26.0 (A) 25.7 (A) 25.7 (A)   MCHC 32.6 31.7 31.3 (A)   RDW 14.1 13.4 13.8   Platelets 279 262 244   (A) Abnormal value            Lipid Panel    Lipid Panel 6/2/21   Total Cholesterol 236 (A)   Triglycerides 147   HDL Cholesterol 49   VLDL Cholesterol 29   LDL Cholesterol  158 (A)   (A) Abnormal value       Comments are available for some flowsheets but are not being displayed.                 Data reviewed: Primary care records reviewed, previous echo and stress test reviewed.  EKG recently showed sinus rhythm with no ST changes     Procedures           Assessment and Plan        ASSESSMENT:  Encounter Diagnoses   Name Primary?   • PEREZ (dyspnea on exertion) Yes   • Hypertension, essential          PLAN:    1.  Asya has chronic dyspnea on exertion.  Mildly progressive.  Cardiac work-up in 2021 was negative.  A chest x-ray and pulmonary function testing will be scheduled.  Her BNP is normal.  It does not appear to be cardiac dyspnea at this time.  2.  Essential hypertension, controlled, continue current medical therapy.  3.  We will discuss diagnostic results when available          Patient was given instructions and counseling regarding her condition or for health maintenance advice. Please see specific information pulled into the AVS if appropriate.             ZO Valdivia MD  5/4/2022    10:37 EDT

## 2022-05-09 ENCOUNTER — HOSPITAL ENCOUNTER (OUTPATIENT)
Dept: MAMMOGRAPHY | Facility: HOSPITAL | Age: 77
Discharge: HOME | End: 2022-05-09
Admitting: NURSE PRACTITIONER

## 2022-05-09 DIAGNOSIS — Z12.31 VISIT FOR SCREENING MAMMOGRAM: ICD-10-CM

## 2022-05-09 PROCEDURE — 77067 SCR MAMMO BI INCL CAD: CPT

## 2022-05-10 ENCOUNTER — OFFICE VISIT (OUTPATIENT)
Dept: FAMILY MEDICINE CLINIC | Facility: CLINIC | Age: 77
End: 2022-05-10

## 2022-05-10 VITALS
SYSTOLIC BLOOD PRESSURE: 138 MMHG | TEMPERATURE: 97.5 F | DIASTOLIC BLOOD PRESSURE: 74 MMHG | HEART RATE: 113 BPM | HEIGHT: 60 IN | BODY MASS INDEX: 31.77 KG/M2 | OXYGEN SATURATION: 95 % | WEIGHT: 161.8 LBS

## 2022-05-10 DIAGNOSIS — S59.902A INJURY OF LEFT ELBOW, INITIAL ENCOUNTER: Primary | ICD-10-CM

## 2022-05-10 PROBLEM — N18.31 STAGE 3A CHRONIC KIDNEY DISEASE (HCC): Status: ACTIVE | Noted: 2021-11-23

## 2022-05-10 PROBLEM — I10 HYPERTENSION: Status: ACTIVE | Noted: 2021-11-23

## 2022-05-10 PROBLEM — E83.52 HYPERCALCEMIA: Status: ACTIVE | Noted: 2021-11-23

## 2022-05-10 PROCEDURE — 99213 OFFICE O/P EST LOW 20 MIN: CPT | Performed by: FAMILY MEDICINE

## 2022-05-10 RX ORDER — ASPIRIN 81 MG/1
81 TABLET, CHEWABLE ORAL DAILY
COMMUNITY
End: 2022-10-21

## 2022-05-10 RX ORDER — ALBUTEROL SULFATE 90 UG/1
2 AEROSOL, METERED RESPIRATORY (INHALATION) EVERY 4 HOURS PRN
Qty: 18 G | Refills: 1 | Status: SHIPPED | OUTPATIENT
Start: 2022-05-10

## 2022-05-10 NOTE — PROGRESS NOTES
"Chief Complaint    Joint Swelling (Right elbow swollen and red, hit on door casing 4 days ago.  )    Subjective      Asya Burris presents to [facility]    History of Present Illness    1.) INJURY OF LEFT ELBOW : Occurred approximately 4 days ago. Trauma via furniture at home. Patient presents today with complaints of effusion and erythema. She also presents with increased warmth. She reports that her effusion is improving. She is concerned for a fracture.    *? Chronic lung disease. Patient is requesting a rescue inhaler.*     Objective     Vital Signs:     /74 (BP Location: Right arm, Patient Position: Sitting, Cuff Size: Adult)   Pulse 113   Temp 97.5 °F (36.4 °C) (Temporal)   Ht 152.4 cm (60\")   Wt 73.4 kg (161 lb 12.8 oz)   SpO2 95%   BMI 31.60 kg/m²       Physical Exam  Vitals reviewed.   Constitutional:       General: She is not in acute distress.     Appearance: Normal appearance. She is well-developed.   HENT:      Head: Normocephalic and atraumatic.      Right Ear: Hearing and external ear normal.      Left Ear: Hearing and external ear normal.      Nose: Nose normal.   Eyes:      General: Lids are normal.         Right eye: No discharge.         Left eye: No discharge.      Conjunctiva/sclera: Conjunctivae normal.   Pulmonary:      Effort: Pulmonary effort is normal.   Abdominal:      General: There is no distension.   Musculoskeletal:         General: No swelling.        Arms:       Cervical back: Neck supple.      Comments: Edema appreciated of area noted above. Areas of point tenderness with palpation. Erythema of skin appreciated. Increased warmth appreciated.   Skin:     Coloration: Skin is not jaundiced.      Findings: No erythema.   Neurological:      Mental Status: She is alert. Mental status is at baseline.   Psychiatric:         Mood and Affect: Mood and affect normal.         Thought Content: Thought content normal.     Assessment and Plan "     Diagnoses and all orders for this visit:    1. Injury of left elbow, initial encounter (Primary)  Comments:  X-ray neg - will await official report. Recommend ice - BID x 20 mins per session. Topical NSAID as noted, at least BID.   Orders:  -     XR Elbow 2 View Left (In Office)    Other orders  -     Diclofenac Sodium (VOLTAREN) 1 % gel gel; Apply 4 g topically to the appropriate area as directed 4 (Four) Times a Day As Needed (elbow discomfort and pain).  Dispense: 200 g; Refill: 0  -     albuterol sulfate  (90 Base) MCG/ACT inhaler; Inhale 2 puffs Every 4 (Four) Hours As Needed for Wheezing.  Dispense: 18 g; Refill: 1    Follow Up     Return if symptoms worsen or fail to improve.    Patient was given instructions and counseling regarding her condition or for health maintenance advice. Please see specific information pulled into the AVS if appropriate.

## 2022-07-18 ENCOUNTER — TELEPHONE (OUTPATIENT)
Dept: FAMILY MEDICINE CLINIC | Facility: CLINIC | Age: 77
End: 2022-07-18

## 2022-07-18 NOTE — TELEPHONE ENCOUNTER
HUB can share.  I called patient to schedule AWV that is overdue. She ask for call back in the fall to schedule, if she calls instead. Please schedule.   Thanks.

## 2022-09-26 ENCOUNTER — APPOINTMENT (OUTPATIENT)
Dept: RESPIRATORY THERAPY | Facility: HOSPITAL | Age: 77
End: 2022-09-26

## 2022-10-18 ENCOUNTER — TRANSCRIBE ORDERS (OUTPATIENT)
Dept: LAB | Facility: HOSPITAL | Age: 77
End: 2022-10-18

## 2022-10-18 ENCOUNTER — LAB (OUTPATIENT)
Dept: LAB | Facility: HOSPITAL | Age: 77
End: 2022-10-18

## 2022-10-18 ENCOUNTER — HOSPITAL ENCOUNTER (OUTPATIENT)
Dept: RESPIRATORY THERAPY | Facility: HOSPITAL | Age: 77
Discharge: HOME | End: 2022-10-18

## 2022-10-18 DIAGNOSIS — N18.31 STAGE 3A CHRONIC KIDNEY DISEASE: Primary | ICD-10-CM

## 2022-10-18 DIAGNOSIS — R06.09 DOE (DYSPNEA ON EXERTION): ICD-10-CM

## 2022-10-18 DIAGNOSIS — N18.31 STAGE 3A CHRONIC KIDNEY DISEASE: ICD-10-CM

## 2022-10-18 LAB
ALBUMIN SERPL-MCNC: 4.4 G/DL (ref 3.5–5.2)
ANION GAP SERPL CALCULATED.3IONS-SCNC: 12.7 MMOL/L (ref 5–15)
BACTERIA UR QL AUTO: NORMAL /HPF
BASOPHILS # BLD AUTO: 0.04 10*3/MM3 (ref 0–0.2)
BASOPHILS NFR BLD AUTO: 0.5 % (ref 0–1.5)
BILIRUB UR QL STRIP: NEGATIVE
BUN SERPL-MCNC: 20 MG/DL (ref 8–23)
BUN/CREAT SERPL: 20.4 (ref 7–25)
CALCIUM SPEC-SCNC: 10.1 MG/DL (ref 8.6–10.5)
CHLORIDE SERPL-SCNC: 97 MMOL/L (ref 98–107)
CLARITY UR: ABNORMAL
CO2 SERPL-SCNC: 26.3 MMOL/L (ref 22–29)
COLOR UR: ABNORMAL
CREAT SERPL-MCNC: 0.98 MG/DL (ref 0.57–1)
CREAT UR-MCNC: 114.5 MG/DL
DEPRECATED RDW RBC AUTO: 40.3 FL (ref 37–54)
EGFRCR SERPLBLD CKD-EPI 2021: 60.3 ML/MIN/1.73
EOSINOPHIL # BLD AUTO: 0.12 10*3/MM3 (ref 0–0.4)
EOSINOPHIL NFR BLD AUTO: 1.6 % (ref 0.3–6.2)
ERYTHROCYTE [DISTWIDTH] IN BLOOD BY AUTOMATED COUNT: 13.8 % (ref 12.3–15.4)
GLUCOSE SERPL-MCNC: 138 MG/DL (ref 65–99)
GLUCOSE UR STRIP-MCNC: NEGATIVE MG/DL
HCT VFR BLD AUTO: 39.5 % (ref 34–46.6)
HGB BLD-MCNC: 12.3 G/DL (ref 12–15.9)
HGB UR QL STRIP.AUTO: NEGATIVE
HYALINE CASTS UR QL AUTO: NORMAL /LPF
IMM GRANULOCYTES # BLD AUTO: 0.02 10*3/MM3 (ref 0–0.05)
IMM GRANULOCYTES NFR BLD AUTO: 0.3 % (ref 0–0.5)
KETONES UR QL STRIP: NEGATIVE
LEUKOCYTE ESTERASE UR QL STRIP.AUTO: NEGATIVE
LYMPHOCYTES # BLD AUTO: 1.8 10*3/MM3 (ref 0.7–3.1)
LYMPHOCYTES NFR BLD AUTO: 24 % (ref 19.6–45.3)
MCH RBC QN AUTO: 25.5 PG (ref 26.6–33)
MCHC RBC AUTO-ENTMCNC: 31.1 G/DL (ref 31.5–35.7)
MCV RBC AUTO: 82 FL (ref 79–97)
MONOCYTES # BLD AUTO: 0.48 10*3/MM3 (ref 0.1–0.9)
MONOCYTES NFR BLD AUTO: 6.4 % (ref 5–12)
NEUTROPHILS NFR BLD AUTO: 5.05 10*3/MM3 (ref 1.7–7)
NEUTROPHILS NFR BLD AUTO: 67.2 % (ref 42.7–76)
NITRITE UR QL STRIP: NEGATIVE
NRBC BLD AUTO-RTO: 0 /100 WBC (ref 0–0.2)
PH UR STRIP.AUTO: 6.5 [PH] (ref 5–8)
PHOSPHATE SERPL-MCNC: 2.7 MG/DL (ref 2.5–4.5)
PLATELET # BLD AUTO: 245 10*3/MM3 (ref 140–450)
PMV BLD AUTO: 11.4 FL (ref 6–12)
POTASSIUM SERPL-SCNC: 4.2 MMOL/L (ref 3.5–5.2)
PROT ?TM UR-MCNC: 13.2 MG/DL
PROT UR QL STRIP: NEGATIVE
PROT/CREAT UR: 0.12 MG/G{CREAT}
RBC # BLD AUTO: 4.82 10*6/MM3 (ref 3.77–5.28)
RBC # UR STRIP: NORMAL /HPF
REF LAB TEST METHOD: NORMAL
SODIUM SERPL-SCNC: 136 MMOL/L (ref 136–145)
SP GR UR STRIP: 1.02 (ref 1–1.03)
SQUAMOUS #/AREA URNS HPF: NORMAL /HPF
UNIDENT CRYS URNS QL MICRO: NORMAL /HPF
UROBILINOGEN UR QL STRIP: ABNORMAL
WBC # UR STRIP: NORMAL /HPF
WBC NRBC COR # BLD: 7.51 10*3/MM3 (ref 3.4–10.8)

## 2022-10-18 PROCEDURE — 85025 COMPLETE CBC W/AUTO DIFF WBC: CPT

## 2022-10-18 PROCEDURE — 94726 PLETHYSMOGRAPHY LUNG VOLUMES: CPT

## 2022-10-18 PROCEDURE — 81001 URINALYSIS AUTO W/SCOPE: CPT

## 2022-10-18 PROCEDURE — 36415 COLL VENOUS BLD VENIPUNCTURE: CPT

## 2022-10-18 PROCEDURE — 84156 ASSAY OF PROTEIN URINE: CPT

## 2022-10-18 PROCEDURE — 80069 RENAL FUNCTION PANEL: CPT

## 2022-10-18 PROCEDURE — 94060 EVALUATION OF WHEEZING: CPT

## 2022-10-18 PROCEDURE — 82570 ASSAY OF URINE CREATININE: CPT

## 2022-10-18 RX ORDER — ALBUTEROL SULFATE 2.5 MG/3ML
2.5 SOLUTION RESPIRATORY (INHALATION) ONCE
Status: COMPLETED | OUTPATIENT
Start: 2022-10-18 | End: 2022-10-18

## 2022-10-18 RX ADMIN — ALBUTEROL SULFATE 2.5 MG: 2.5 SOLUTION RESPIRATORY (INHALATION) at 10:25

## 2022-10-21 ENCOUNTER — OFFICE VISIT (OUTPATIENT)
Dept: FAMILY MEDICINE CLINIC | Facility: CLINIC | Age: 77
End: 2022-10-21

## 2022-10-21 VITALS
DIASTOLIC BLOOD PRESSURE: 70 MMHG | HEART RATE: 91 BPM | SYSTOLIC BLOOD PRESSURE: 138 MMHG | BODY MASS INDEX: 31.61 KG/M2 | HEIGHT: 60 IN | OXYGEN SATURATION: 100 % | WEIGHT: 161 LBS

## 2022-10-21 DIAGNOSIS — I10 BENIGN ESSENTIAL HYPERTENSION: ICD-10-CM

## 2022-10-21 DIAGNOSIS — F41.9 ANXIETY DISORDER, UNSPECIFIED TYPE: ICD-10-CM

## 2022-10-21 DIAGNOSIS — G47.00 INSOMNIA, UNSPECIFIED TYPE: ICD-10-CM

## 2022-10-21 DIAGNOSIS — Z00.00 ENCOUNTER FOR ANNUAL WELLNESS EXAM IN MEDICARE PATIENT: Primary | ICD-10-CM

## 2022-10-21 DIAGNOSIS — K21.9 GASTROESOPHAGEAL REFLUX DISEASE, UNSPECIFIED WHETHER ESOPHAGITIS PRESENT: ICD-10-CM

## 2022-10-21 DIAGNOSIS — Z23 IMMUNIZATION DUE: ICD-10-CM

## 2022-10-21 PROCEDURE — 90662 IIV NO PRSV INCREASED AG IM: CPT | Performed by: NURSE PRACTITIONER

## 2022-10-21 PROCEDURE — G0439 PPPS, SUBSEQ VISIT: HCPCS | Performed by: NURSE PRACTITIONER

## 2022-10-21 PROCEDURE — G0008 ADMIN INFLUENZA VIRUS VAC: HCPCS | Performed by: NURSE PRACTITIONER

## 2022-10-21 PROCEDURE — 1170F FXNL STATUS ASSESSED: CPT | Performed by: NURSE PRACTITIONER

## 2022-10-21 PROCEDURE — 1159F MED LIST DOCD IN RCRD: CPT | Performed by: NURSE PRACTITIONER

## 2022-10-21 RX ORDER — LOSARTAN POTASSIUM 100 MG/1
100 TABLET ORAL DAILY
Qty: 90 TABLET | Refills: 1 | Status: SHIPPED | OUTPATIENT
Start: 2022-10-21 | End: 2023-02-27

## 2022-10-21 RX ORDER — TRAZODONE HYDROCHLORIDE 50 MG/1
50 TABLET ORAL NIGHTLY
Qty: 90 TABLET | Refills: 1 | Status: SHIPPED | OUTPATIENT
Start: 2022-10-21 | End: 2023-02-27

## 2022-10-21 RX ORDER — BUPROPION HYDROCHLORIDE 150 MG/1
150 TABLET ORAL EVERY MORNING
Qty: 90 TABLET | Refills: 1 | Status: SHIPPED | OUTPATIENT
Start: 2022-10-21 | End: 2023-02-27

## 2022-10-21 RX ORDER — OMEPRAZOLE 40 MG/1
40 CAPSULE, DELAYED RELEASE ORAL DAILY
Qty: 90 CAPSULE | Refills: 1 | Status: SHIPPED | OUTPATIENT
Start: 2022-10-21 | End: 2023-02-27

## 2022-10-21 NOTE — PROGRESS NOTES
The ABCs of the Annual Wellness Visit  Subsequent Medicare Wellness Visit    Chief Complaint   Patient presents with   • Medicare Wellness-subsequent     Annual wellness      Subjective    History of Present Illness:  Asya Burris is a 76 y.o. female who presents for a Subsequent Medicare Wellness Visit.    The following portions of the patient's history were reviewed and   updated as appropriate: allergies, current medications, past family history, past medical history, past social history, past surgical history and problem list.    Compared to one year ago, the patient feels her physical   health is the same.    Compared to one year ago, the patient feels her mental   health is the same. Pt does note some increased stress at times.     Pt is also due for refills of medication.     Hypertension: Stable on current medication regimen.    Anxiety: States mood is stable on current medications.    Insomnia: Patient states that the trazodone helps with sleep the majority of the time but will still have some nights that she is unable to sleep well.    Pt is following up with Pulmonology next week.     Pt had renal labs drawn last week by NAMAN Wade unsure what specialty.     Recent Hospitalizations:  She was not admitted to the hospital during the last year.       Current Medical Providers:  Patient Care Team:  Kaylee Floyd APRN as PCP - General (Nurse Practitioner)    Outpatient Medications Prior to Visit   Medication Sig Dispense Refill   • aspirin 81 MG chewable tablet Chew 81 mg Daily.     • buPROPion XL (WELLBUTRIN XL) 150 MG 24 hr tablet Take 1 tablet by mouth Every Morning. 90 tablet 1   • losartan (COZAAR) 100 MG tablet Take 1 tablet by mouth Daily. 90 tablet 1   • omeprazole (priLOSEC) 40 MG capsule Take 1 capsule by mouth Daily. 90 capsule 1   • sertraline (ZOLOFT) 50 MG tablet Take 1.5 tablets by mouth Daily. 135 tablet 1   • traZODone (DESYREL) 50 MG tablet Take 1 tablet by mouth Every Night.  "90 tablet 1   • albuterol sulfate  (90 Base) MCG/ACT inhaler Inhale 2 puffs Every 4 (Four) Hours As Needed for Wheezing. 18 g 1   • Diclofenac Sodium (VOLTAREN) 1 % gel gel Apply 4 g topically to the appropriate area as directed 4 (Four) Times a Day As Needed (elbow discomfort and pain). 200 g 0     No facility-administered medications prior to visit.       No opioid medication identified on active medication list. I have reviewed chart for other potential  high risk medication/s and harmful drug interactions in the elderly.          Aspirin is on active medication list. Aspirin use is not indicated based on review of current medical condition/s. Risk of harm outweighs potential benefits. Patient instructed to discontinue this medication.  .Recommended pt to discontinue ASA due to no hx of heart attack or stroke.       Patient Active Problem List   Diagnosis   • Anxiety disorder   • Arthritis   • Benign essential hypertension   • Cataract   • Depression   • GERD (gastroesophageal reflux disease)   • Hyperlipidemia   • Low back pain   • Migraines   • Sleep apnea   • Hypertension   • Stage 3a chronic kidney disease (HCC)   • Hypercalcemia   • CTS (carpal tunnel syndrome)   • Cervical disc disorder     Advance Care Planning  Advance Directive is on file.  ACP discussion was held with the patient during this visit. Patient has an advance directive in EMR which is still valid.     Review of Systems   HENT: Negative for trouble swallowing.    Respiratory: Positive for shortness of breath. Negative for wheezing.    Cardiovascular: Negative for chest pain and palpitations.   Gastrointestinal: Positive for constipation (improved with Miralax). Negative for diarrhea, nausea and vomiting.   Neurological: Negative for dizziness and numbness.        Objective    Vitals:    10/21/22 1112   BP: 138/70   BP Location: Right arm   Pulse: 91   SpO2: 100%   Weight: 73 kg (161 lb)   Height: 152.4 cm (60\")     Estimated body mass " "index is 31.44 kg/m² as calculated from the following:    Height as of this encounter: 152.4 cm (60\").    Weight as of this encounter: 73 kg (161 lb).    BMI is >= 30 and <35. (Class 1 Obesity). The following options were offered after discussion;: weight loss educational material (shared in after visit summary)      Does the patient have evidence of cognitive impairment? Yes pt notes occasional forgetfulness. Pt has not concerns for dementia.     Physical Exam  Vitals reviewed.   Constitutional:       General: She is not in acute distress.     Appearance: Normal appearance. She is well-developed.   Eyes:      Conjunctiva/sclera: Conjunctivae normal.      Pupils: Pupils are equal, round, and reactive to light.   Cardiovascular:      Rate and Rhythm: Normal rate and regular rhythm.      Heart sounds: Normal heart sounds.   Pulmonary:      Effort: Pulmonary effort is normal.      Breath sounds: Normal breath sounds.   Musculoskeletal:      Cervical back: Neck supple.      Right lower leg: No edema.      Left lower leg: No edema.   Skin:     General: Skin is warm and dry.   Neurological:      Mental Status: She is alert and oriented to person, place, and time.      Cranial Nerves: No cranial nerve deficit.   Psychiatric:         Mood and Affect: Mood and affect normal.         Behavior: Behavior normal.         Thought Content: Thought content normal.         Judgment: Judgment normal.                 HEALTH RISK ASSESSMENT    Smoking Status:  Social History     Tobacco Use   Smoking Status Never   Smokeless Tobacco Never     Alcohol Consumption:  Social History     Substance and Sexual Activity   Alcohol Use Never     Fall Risk Screen:    STEADI Fall Risk Assessment was completed, and patient is at MODERATE risk for falls. Assessment completed on:10/21/2022    Depression Screening:  PHQ-2/PHQ-9 Depression Screening 10/21/2022   Retired PHQ-9 Total Score -   Retired Total Score -   Little Interest or Pleasure in Doing " Things 0-->not at all   Feeling Down, Depressed or Hopeless 1-->several days   Trouble Falling or Staying Asleep, or Sleeping Too Much -   Feeling Tired or Having Little Energy -   Poor Appetite or Overeating -   Feeling Bad about Yourself - or that You are a Failure or Have Let Yourself or Your Family Down -   Trouble Concentrating on Things, Such as Reading the Newspaper or Watching Television -   Moving or Speaking So Slowly that Other People Could Have Noticed? Or the Opposite - Being So Fidgety -   Thoughts that You Would be Better Off Dead or of Hurting Yourself in Some Way -   PHQ-9: Brief Depression Severity Measure Score 1   If You Checked Off Any Problems, How Difficult Have These Problems Made It For You to Do Your Work, Take Care of Things at Home, or Get Along with Other People? -       Health Habits and Functional and Cognitive Screening:  Functional & Cognitive Status 10/21/2022   Do you have difficulty preparing food and eating? No   Do you have difficulty bathing yourself, getting dressed or grooming yourself? No   Do you have difficulty using the toilet? No   Do you have difficulty moving around from place to place? No   Do you have trouble with steps or getting out of a bed or a chair? No   Current Diet Well Balanced Diet   Dental Exam Up to date   Eye Exam Up to date   Exercise (times per week) 0 times per week   Current Exercises Include No Regular Exercise   Do you need help using the phone?  No   Are you deaf or do you have serious difficulty hearing?  Yes   Do you need help with transportation? No   Do you need help shopping? No   Do you need help preparing meals?  No   Do you need help with housework?  No   Do you need help with laundry? No   Do you need help taking your medications? No   Do you need help managing money? No   Have you felt unusual stress, anger or loneliness in the last month? No   Who do you live with? Child   If you need help, do you have trouble finding someone available  to you? No   Have you been bothered in the last four weeks by sexual problems? No   Do you have difficulty concentrating, remembering or making decisions? No       Age-appropriate Screening Schedule:  Refer to the list below for future screening recommendations based on patient's age, sex and/or medical conditions. Orders for these recommended tests are listed in the plan section. The patient has been provided with a written plan.    Health Maintenance   Topic Date Due   • TDAP/TD VACCINES (1 - Tdap) Never done   • ZOSTER VACCINE (1 of 2) Never done   • LIPID PANEL  06/02/2022   • DXA SCAN  04/06/2023   • INFLUENZA VACCINE  Completed              Assessment & Plan   CMS Preventative Services Quick Reference  Risk Factors Identified During Encounter  Depression/Dysphoria  Fall Risk-High or Moderate  Hearing Problem  Immunizations Discussed/Encouraged (specific Immunizations; Tdap, Influenza and Shingrix  Inactivity/Sedentary  The above risks/problems have been discussed with the patient.  Follow up actions/plans if indicated are seen below in the Assessment/Plan Section.  Pertinent information has been shared with the patient in the After Visit Summary.    Pt states she had Tdap in July/August 2022 in [place]. Pt states she was bit by a dog.   Pt agrees to flu vaccine today. Pt declines Shingrix, states she had shingles.     Diagnoses and all orders for this visit:    1. Encounter for annual wellness exam in Medicare patient (Primary)    2. Benign essential hypertension  -     losartan (COZAAR) 100 MG tablet; Take 1 tablet by mouth Daily.  Dispense: 90 tablet; Refill: 1  -     Comprehensive Metabolic Panel  -     Lipid Panel  -     TSH Rfx On Abnormal To Free T4  -     Urinalysis With Culture If Indicated -    3. Anxiety disorder, unspecified type  -     buPROPion XL (WELLBUTRIN XL) 150 MG 24 hr tablet; Take 1 tablet by mouth Every Morning.  Dispense: 90 tablet; Refill: 1  -     sertraline (ZOLOFT) 50 MG tablet; Take  1.5 tablets by mouth Daily.  Dispense: 135 tablet; Refill: 1    4. Gastroesophageal reflux disease, unspecified whether esophagitis present  -     omeprazole (priLOSEC) 40 MG capsule; Take 1 capsule by mouth Daily.  Dispense: 90 capsule; Refill: 1    5. Insomnia, unspecified type  -     traZODone (DESYREL) 50 MG tablet; Take 1 tablet by mouth Every Night.  Dispense: 90 tablet; Refill: 1    6. Immunization due  -     Fluzone High-Dose 65+yrs ([id number])        Follow Up:   Return in about 6 months (around 4/21/2023) for Refills and fasting labs.     An After Visit Summary and PPPS were made available to the patient.

## 2022-11-04 ENCOUNTER — CLINICAL SUPPORT (OUTPATIENT)
Dept: FAMILY MEDICINE CLINIC | Facility: CLINIC | Age: 77
End: 2022-11-04

## 2022-11-04 DIAGNOSIS — I10 BENIGN ESSENTIAL HYPERTENSION: ICD-10-CM

## 2022-11-04 LAB
ALBUMIN SERPL-MCNC: 4.7 G/DL (ref 3.5–5.2)
ALBUMIN/GLOB SERPL: 1.5 G/DL
ALP SERPL-CCNC: 61 U/L (ref 39–117)
ALT SERPL W P-5'-P-CCNC: 15 U/L (ref 1–33)
ANION GAP SERPL CALCULATED.3IONS-SCNC: 12 MMOL/L (ref 5–15)
AST SERPL-CCNC: 30 U/L (ref 1–32)
BILIRUB SERPL-MCNC: 0.3 MG/DL (ref 0–1.2)
BUN SERPL-MCNC: 18 MG/DL (ref 8–23)
BUN/CREAT SERPL: 16.4 (ref 7–25)
CALCIUM SPEC-SCNC: 10.7 MG/DL (ref 8.6–10.5)
CHLORIDE SERPL-SCNC: 99 MMOL/L (ref 98–107)
CHOLEST SERPL-MCNC: 221 MG/DL (ref 0–200)
CO2 SERPL-SCNC: 28 MMOL/L (ref 22–29)
CREAT SERPL-MCNC: 1.1 MG/DL (ref 0.57–1)
EGFRCR SERPLBLD CKD-EPI 2021: 52.5 ML/MIN/1.73
GLOBULIN UR ELPH-MCNC: 3.2 GM/DL
GLUCOSE SERPL-MCNC: 126 MG/DL (ref 65–99)
HDLC SERPL-MCNC: 54 MG/DL (ref 40–60)
LDLC SERPL CALC-MCNC: 150 MG/DL (ref 0–100)
LDLC/HDLC SERPL: 2.74 {RATIO}
POTASSIUM SERPL-SCNC: 4.9 MMOL/L (ref 3.5–5.2)
PROT SERPL-MCNC: 7.9 G/DL (ref 6–8.5)
SODIUM SERPL-SCNC: 139 MMOL/L (ref 136–145)
TRIGL SERPL-MCNC: 95 MG/DL (ref 0–150)
TSH SERPL DL<=0.05 MIU/L-ACNC: 2.04 UIU/ML (ref 0.27–4.2)
VLDLC SERPL-MCNC: 17 MG/DL (ref 5–40)

## 2022-11-04 PROCEDURE — 36415 COLL VENOUS BLD VENIPUNCTURE: CPT | Performed by: NURSE PRACTITIONER

## 2022-11-04 PROCEDURE — 84443 ASSAY THYROID STIM HORMONE: CPT | Performed by: NURSE PRACTITIONER

## 2022-11-04 PROCEDURE — 80053 COMPREHEN METABOLIC PANEL: CPT | Performed by: NURSE PRACTITIONER

## 2022-11-04 PROCEDURE — 80061 LIPID PANEL: CPT | Performed by: NURSE PRACTITIONER

## 2022-11-04 NOTE — PROGRESS NOTES
Venipuncture Blood Specimen Collection  Venipuncture performed in left arm by Jade Gutiérrez with good hemostasis. Patient tolerated the procedure well without complications.   11/04/22   Jade Gutiérrez

## 2022-11-07 DIAGNOSIS — R73.09 ELEVATED GLUCOSE LEVEL: Primary | ICD-10-CM

## 2022-11-08 ENCOUNTER — CLINICAL SUPPORT (OUTPATIENT)
Dept: FAMILY MEDICINE CLINIC | Facility: CLINIC | Age: 77
End: 2022-11-08

## 2022-11-08 DIAGNOSIS — R73.09 ELEVATED RANDOM BLOOD GLUCOSE LEVEL: ICD-10-CM

## 2022-11-08 PROCEDURE — 36415 COLL VENOUS BLD VENIPUNCTURE: CPT | Performed by: NURSE PRACTITIONER

## 2022-11-08 PROCEDURE — 83036 HEMOGLOBIN GLYCOSYLATED A1C: CPT | Performed by: NURSE PRACTITIONER

## 2022-11-08 NOTE — PROGRESS NOTES
Venipuncture Blood Specimen Collection  Venipuncture performed in left arm by Jade Gutiérrez with good hemostasis. Patient tolerated the procedure well without complications.   11/08/22   Jade Gutiérrez

## 2022-11-09 LAB — HBA1C MFR BLD: 6.7 % (ref 4.8–5.6)

## 2022-11-29 DIAGNOSIS — G47.00 INSOMNIA, UNSPECIFIED TYPE: ICD-10-CM

## 2022-11-29 DIAGNOSIS — I10 BENIGN ESSENTIAL HYPERTENSION: ICD-10-CM

## 2022-11-29 DIAGNOSIS — K21.9 GASTROESOPHAGEAL REFLUX DISEASE, UNSPECIFIED WHETHER ESOPHAGITIS PRESENT: ICD-10-CM

## 2022-11-29 DIAGNOSIS — F41.9 ANXIETY DISORDER, UNSPECIFIED TYPE: ICD-10-CM

## 2022-11-29 RX ORDER — TRAZODONE HYDROCHLORIDE 50 MG/1
TABLET ORAL
Qty: 90 TABLET | Refills: 0 | OUTPATIENT
Start: 2022-11-29

## 2022-11-29 RX ORDER — OMEPRAZOLE 40 MG/1
CAPSULE, DELAYED RELEASE ORAL
Qty: 90 CAPSULE | Refills: 0 | OUTPATIENT
Start: 2022-11-29

## 2022-11-29 RX ORDER — LOSARTAN POTASSIUM 100 MG/1
TABLET ORAL
Qty: 90 TABLET | Refills: 0 | OUTPATIENT
Start: 2022-11-29

## 2022-11-29 RX ORDER — BUPROPION HYDROCHLORIDE 150 MG/1
TABLET ORAL
Qty: 90 TABLET | Refills: 0 | OUTPATIENT
Start: 2022-11-29

## 2023-01-26 ENCOUNTER — HOSPITAL ENCOUNTER (OUTPATIENT)
Dept: GENERAL RADIOLOGY | Facility: HOSPITAL | Age: 78
Discharge: HOME | End: 2023-01-26
Payer: MEDICARE

## 2023-01-26 ENCOUNTER — TRANSCRIBE ORDERS (OUTPATIENT)
Dept: ADMINISTRATIVE | Facility: HOSPITAL | Age: 78
End: 2023-01-26
Payer: MEDICARE

## 2023-01-26 ENCOUNTER — HOSPITAL ENCOUNTER (OUTPATIENT)
Dept: CARDIOLOGY | Facility: HOSPITAL | Age: 78
Discharge: HOME | End: 2023-01-26
Payer: MEDICARE

## 2023-01-26 ENCOUNTER — LAB (OUTPATIENT)
Dept: LAB | Facility: HOSPITAL | Age: 78
End: 2023-01-26
Payer: MEDICARE

## 2023-01-26 DIAGNOSIS — R26.2 CANNOT WALK: Primary | ICD-10-CM

## 2023-01-26 DIAGNOSIS — M25.572 ARTHRALGIA OF LEFT ANKLE OR FOOT: ICD-10-CM

## 2023-01-26 DIAGNOSIS — R07.9 CHEST PAIN, UNSPECIFIED TYPE: ICD-10-CM

## 2023-01-26 DIAGNOSIS — R26.2 CANNOT WALK: ICD-10-CM

## 2023-01-26 DIAGNOSIS — M79.672 LEFT FOOT PAIN: ICD-10-CM

## 2023-01-26 LAB
ALBUMIN SERPL-MCNC: 4.7 G/DL (ref 3.5–5.2)
ALBUMIN/GLOB SERPL: 1.7 G/DL
ALP SERPL-CCNC: 60 U/L (ref 39–117)
ALT SERPL W P-5'-P-CCNC: 13 U/L (ref 1–33)
ANION GAP SERPL CALCULATED.3IONS-SCNC: 11.8 MMOL/L (ref 5–15)
AST SERPL-CCNC: 26 U/L (ref 1–32)
BASOPHILS # BLD AUTO: 0.05 10*3/MM3 (ref 0–0.2)
BASOPHILS NFR BLD AUTO: 0.7 % (ref 0–1.5)
BILIRUB SERPL-MCNC: 0.3 MG/DL (ref 0–1.2)
BUN SERPL-MCNC: 23 MG/DL (ref 8–23)
BUN/CREAT SERPL: 23.5 (ref 7–25)
CALCIUM SPEC-SCNC: 10 MG/DL (ref 8.6–10.5)
CHLORIDE SERPL-SCNC: 102 MMOL/L (ref 98–107)
CO2 SERPL-SCNC: 25.2 MMOL/L (ref 22–29)
CREAT SERPL-MCNC: 0.98 MG/DL (ref 0.57–1)
DEPRECATED RDW RBC AUTO: 38.8 FL (ref 37–54)
EGFRCR SERPLBLD CKD-EPI 2021: 59.9 ML/MIN/1.73
EOSINOPHIL # BLD AUTO: 0.15 10*3/MM3 (ref 0–0.4)
EOSINOPHIL NFR BLD AUTO: 2.2 % (ref 0.3–6.2)
ERYTHROCYTE [DISTWIDTH] IN BLOOD BY AUTOMATED COUNT: 13.6 % (ref 12.3–15.4)
GLOBULIN UR ELPH-MCNC: 2.8 GM/DL
GLUCOSE SERPL-MCNC: 75 MG/DL (ref 65–99)
HCT VFR BLD AUTO: 38.4 % (ref 34–46.6)
HGB BLD-MCNC: 12.2 G/DL (ref 12–15.9)
IMM GRANULOCYTES # BLD AUTO: 0.02 10*3/MM3 (ref 0–0.05)
IMM GRANULOCYTES NFR BLD AUTO: 0.3 % (ref 0–0.5)
INR PPP: 1.01 (ref 0.86–1.15)
LYMPHOCYTES # BLD AUTO: 2.07 10*3/MM3 (ref 0.7–3.1)
LYMPHOCYTES NFR BLD AUTO: 29.7 % (ref 19.6–45.3)
MCH RBC QN AUTO: 25.1 PG (ref 26.6–33)
MCHC RBC AUTO-ENTMCNC: 31.8 G/DL (ref 31.5–35.7)
MCV RBC AUTO: 79 FL (ref 79–97)
MONOCYTES # BLD AUTO: 0.52 10*3/MM3 (ref 0.1–0.9)
MONOCYTES NFR BLD AUTO: 7.5 % (ref 5–12)
NEUTROPHILS NFR BLD AUTO: 4.16 10*3/MM3 (ref 1.7–7)
NEUTROPHILS NFR BLD AUTO: 59.6 % (ref 42.7–76)
NRBC BLD AUTO-RTO: 0 /100 WBC (ref 0–0.2)
PLATELET # BLD AUTO: 220 10*3/MM3 (ref 140–450)
PMV BLD AUTO: 10.9 FL (ref 6–12)
POTASSIUM SERPL-SCNC: 4 MMOL/L (ref 3.5–5.2)
PROT SERPL-MCNC: 7.5 G/DL (ref 6–8.5)
PROTHROMBIN TIME: 13.4 SECONDS (ref 11.8–14.9)
RBC # BLD AUTO: 4.86 10*6/MM3 (ref 3.77–5.28)
SODIUM SERPL-SCNC: 139 MMOL/L (ref 136–145)
WBC NRBC COR # BLD: 6.97 10*3/MM3 (ref 3.4–10.8)

## 2023-01-26 PROCEDURE — 85025 COMPLETE CBC W/AUTO DIFF WBC: CPT

## 2023-01-26 PROCEDURE — 80053 COMPREHEN METABOLIC PANEL: CPT

## 2023-01-26 PROCEDURE — 93010 ELECTROCARDIOGRAM REPORT: CPT | Performed by: INTERNAL MEDICINE

## 2023-01-26 PROCEDURE — 71046 X-RAY EXAM CHEST 2 VIEWS: CPT

## 2023-01-26 PROCEDURE — 93005 ELECTROCARDIOGRAM TRACING: CPT | Performed by: PODIATRIST

## 2023-01-26 PROCEDURE — 36415 COLL VENOUS BLD VENIPUNCTURE: CPT

## 2023-01-26 PROCEDURE — 85610 PROTHROMBIN TIME: CPT

## 2023-02-02 LAB — QT INTERVAL: 374 MS

## 2023-02-03 ENCOUNTER — LAB REQUISITION (OUTPATIENT)
Dept: LAB | Facility: HOSPITAL | Age: 78
End: 2023-02-03
Payer: MEDICARE

## 2023-02-03 DIAGNOSIS — M25.572 PAIN IN LEFT ANKLE AND JOINTS OF LEFT FOOT: ICD-10-CM

## 2023-02-03 DIAGNOSIS — T84.84XA PAIN DUE TO INTERNAL ORTHOPEDIC PROSTHETIC DEVICES, IMPLANTS AND GRAFTS, INITIAL ENCOUNTER: ICD-10-CM

## 2023-02-03 PROCEDURE — 87205 SMEAR GRAM STAIN: CPT | Performed by: PODIATRIST

## 2023-02-03 PROCEDURE — 87075 CULTR BACTERIA EXCEPT BLOOD: CPT | Performed by: PODIATRIST

## 2023-02-03 PROCEDURE — 87015 SPECIMEN INFECT AGNT CONCNTJ: CPT | Performed by: PODIATRIST

## 2023-02-03 PROCEDURE — 87102 FUNGUS ISOLATION CULTURE: CPT | Performed by: PODIATRIST

## 2023-02-03 PROCEDURE — 87070 CULTURE OTHR SPECIMN AEROBIC: CPT | Performed by: PODIATRIST

## 2023-02-06 LAB
BACTERIA SPEC AEROBE CULT: NORMAL
GRAM STN SPEC: NORMAL
GRAM STN SPEC: NORMAL

## 2023-02-08 LAB — BACTERIA SPEC ANAEROBE CULT: NORMAL

## 2023-02-23 ENCOUNTER — OFFICE VISIT (OUTPATIENT)
Dept: FAMILY MEDICINE CLINIC | Facility: CLINIC | Age: 78
End: 2023-02-23
Payer: MEDICARE

## 2023-02-23 VITALS
SYSTOLIC BLOOD PRESSURE: 126 MMHG | DIASTOLIC BLOOD PRESSURE: 72 MMHG | OXYGEN SATURATION: 96 % | TEMPERATURE: 97.5 F | HEART RATE: 110 BPM

## 2023-02-23 DIAGNOSIS — N64.4 BREAST TENDERNESS IN FEMALE: Primary | ICD-10-CM

## 2023-02-23 PROCEDURE — 99213 OFFICE O/P EST LOW 20 MIN: CPT | Performed by: NURSE PRACTITIONER

## 2023-02-23 RX ORDER — HYDROCODONE BITARTRATE AND ACETAMINOPHEN 5; 325 MG/1; MG/1
1 TABLET ORAL EVERY 6 HOURS PRN
COMMUNITY
Start: 2023-02-03

## 2023-02-23 NOTE — PROGRESS NOTES
Chief Complaint  Itching (Head and neck itching, rash sometimes on her chest, she wants a mammogram to make sure cancer is not back)    Subjective        Past Medical History:   Diagnosis Date   • Arthritis    • Cervical disc disorder    • CTS (carpal tunnel syndrome)    • GERD (gastroesophageal reflux disease)    • Hypertension    • Low back pain      Social History     Tobacco Use   • Smoking status: Never     Passive exposure: Past   • Smokeless tobacco: Never   Vaping Use   • Vaping Use: Never used   Substance Use Topics   • Alcohol use: Never   • Drug use: Never      Current Outpatient Medications on File Prior to Visit   Medication Sig   • albuterol sulfate  (90 Base) MCG/ACT inhaler Inhale 2 puffs Every 4 (Four) Hours As Needed for Wheezing.   • buPROPion XL (WELLBUTRIN XL) 150 MG 24 hr tablet Take 1 tablet by mouth Every Morning.   • Diclofenac Sodium (VOLTAREN) 1 % gel gel Apply 4 g topically to the appropriate area as directed 4 (Four) Times a Day As Needed (elbow discomfort and pain).   • HYDROcodone-acetaminophen (NORCO) 5-325 MG per tablet Take 1 tablet by mouth Every 6 (Six) Hours As Needed. for pain   • losartan (COZAAR) 100 MG tablet Take 1 tablet by mouth Daily.   • omeprazole (priLOSEC) 40 MG capsule Take 1 capsule by mouth Daily.   • sertraline (ZOLOFT) 50 MG tablet Take 1.5 tablets by mouth Daily.   • traZODone (DESYREL) 50 MG tablet Take 1 tablet by mouth Every Night.     No current facility-administered medications on file prior to visit.      No Known Allergies   Health Maintenance Due   Topic Date Due   • TDAP/TD VACCINES (1 - Tdap) Never done   • ZOSTER VACCINE (1 of 2) Never done   • Pneumococcal Vaccine 65+ (1 - PCV) Never done   • COVID-19 Vaccine (4 - Booster for Pfizer series) 12/18/2021      Asya Burris presents to [facility]  History of Present Illness  Here with concerns for recurrent cancer. Pt states she had cancer about 20 years ago in  the female organs. Pt was told then if it returned it would likely be in the breast. Pt states her last mammogram was over 1 year ago. Notes some right breast tenderness but states she does sleep on her right side. No palpable masses. Pt notes she is always tired. Pt will have occasionally redness of the chest.     Pt also notes right posterior lower rib achiness x 4-6 months. Pt complains of cough in the morning and shortness of breath.       Objective   Vital Signs:   /72 (BP Location: Left arm)   Pulse 110   Temp 97.5 °F (36.4 °C)   SpO2 96%     Review of Systems   Neurological: Positive for headache. Negative for dizziness.      Physical Exam  Vitals reviewed.   Constitutional:       General: She is not in acute distress.     Appearance: Normal appearance. She is well-developed.   HENT:      Head: Normocephalic and atraumatic.      Right Ear: External ear normal.      Left Ear: External ear normal.   Eyes:      Conjunctiva/sclera: Conjunctivae normal.      Pupils: Pupils are equal, round, and reactive to light.   Cardiovascular:      Rate and Rhythm: Normal rate and regular rhythm.      Heart sounds: Normal heart sounds.   Pulmonary:      Effort: Pulmonary effort is normal.      Breath sounds: Normal breath sounds.   Chest:   Breasts:     Right: Tenderness present. No mass.      Left: Normal. No mass.       Musculoskeletal:      Cervical back: Neck supple.   Skin:     General: Skin is warm and dry.   Neurological:      Mental Status: She is alert and oriented to person, place, and time.   Psychiatric:         Mood and Affect: Mood and affect normal.         Behavior: Behavior normal.         Thought Content: Thought content normal.         Judgment: Judgment normal.        Result Review :                 Assessment and Plan    Diagnoses and all orders for this visit:    1. Breast tenderness in female (Primary)  -     Mammo Diagnostic Digital Tomosynthesis Bilateral With CAD; Future  -     US Breast  Right Limited; Future        Follow Up   Return if symptoms worsen or fail to improve.  Patient was given instructions and counseling regarding her condition or for health maintenance advice. Please see specific information pulled into the AVS if appropriate.

## 2023-02-27 DIAGNOSIS — G47.00 INSOMNIA, UNSPECIFIED TYPE: ICD-10-CM

## 2023-02-27 DIAGNOSIS — I10 BENIGN ESSENTIAL HYPERTENSION: ICD-10-CM

## 2023-02-27 DIAGNOSIS — F41.9 ANXIETY DISORDER, UNSPECIFIED TYPE: ICD-10-CM

## 2023-02-27 DIAGNOSIS — K21.9 GASTROESOPHAGEAL REFLUX DISEASE, UNSPECIFIED WHETHER ESOPHAGITIS PRESENT: ICD-10-CM

## 2023-02-27 RX ORDER — TRAZODONE HYDROCHLORIDE 50 MG/1
TABLET ORAL
Qty: 90 TABLET | Refills: 0 | Status: SHIPPED | OUTPATIENT
Start: 2023-02-27

## 2023-02-27 RX ORDER — LOSARTAN POTASSIUM 100 MG/1
TABLET ORAL
Qty: 90 TABLET | Refills: 0 | Status: SHIPPED | OUTPATIENT
Start: 2023-02-27

## 2023-02-27 RX ORDER — OMEPRAZOLE 40 MG/1
CAPSULE, DELAYED RELEASE ORAL
Qty: 90 CAPSULE | Refills: 0 | Status: SHIPPED | OUTPATIENT
Start: 2023-02-27

## 2023-02-27 RX ORDER — BUPROPION HYDROCHLORIDE 150 MG/1
TABLET ORAL
Qty: 90 TABLET | Refills: 0 | Status: SHIPPED | OUTPATIENT
Start: 2023-02-27

## 2023-03-03 LAB — FUNGUS WND CULT: NORMAL

## 2023-03-14 ENCOUNTER — HOSPITAL ENCOUNTER (OUTPATIENT)
Dept: MAMMOGRAPHY | Facility: HOSPITAL | Age: 78
Discharge: HOME | End: 2023-03-14
Payer: MEDICARE

## 2023-03-14 ENCOUNTER — HOSPITAL ENCOUNTER (OUTPATIENT)
Dept: ULTRASOUND IMAGING | Facility: HOSPITAL | Age: 78
Discharge: HOME | End: 2023-03-14
Payer: MEDICARE

## 2023-03-14 DIAGNOSIS — N64.4 BREAST TENDERNESS IN FEMALE: ICD-10-CM

## 2023-03-14 PROCEDURE — G0279 TOMOSYNTHESIS, MAMMO: HCPCS

## 2023-03-14 PROCEDURE — 76642 ULTRASOUND BREAST LIMITED: CPT

## 2023-03-14 PROCEDURE — 77066 DX MAMMO INCL CAD BI: CPT

## 2023-06-09 DIAGNOSIS — F41.9 ANXIETY DISORDER, UNSPECIFIED TYPE: ICD-10-CM

## 2023-06-09 DIAGNOSIS — G47.00 INSOMNIA, UNSPECIFIED TYPE: ICD-10-CM

## 2023-06-09 DIAGNOSIS — I10 BENIGN ESSENTIAL HYPERTENSION: ICD-10-CM

## 2023-06-10 RX ORDER — TRAZODONE HYDROCHLORIDE 50 MG/1
TABLET ORAL
Qty: 90 TABLET | Refills: 0 | Status: SHIPPED | OUTPATIENT
Start: 2023-06-10

## 2023-06-10 RX ORDER — LOSARTAN POTASSIUM 100 MG/1
TABLET ORAL
Qty: 90 TABLET | Refills: 0 | Status: SHIPPED | OUTPATIENT
Start: 2023-06-10

## 2023-06-10 RX ORDER — BUPROPION HYDROCHLORIDE 150 MG/1
TABLET ORAL
Qty: 90 TABLET | Refills: 0 | Status: SHIPPED | OUTPATIENT
Start: 2023-06-10

## 2023-06-20 ENCOUNTER — TELEPHONE (OUTPATIENT)
Dept: FAMILY MEDICINE CLINIC | Facility: CLINIC | Age: 78
End: 2023-06-20

## 2023-06-20 NOTE — TELEPHONE ENCOUNTER
Caller: [facility]    Best call back number: [phone]     Who are you requesting to speak with (clinical staff, provider,  specific staff member): MEDICAL STAFF    Do you know the name of the person who called: DOMINGO    What was the call regarding: DOMINGO WOULD LIKE THE PATIENTS MEDICATION LIST FAXED TO: [phone] .

## 2023-07-12 ENCOUNTER — TELEPHONE (OUTPATIENT)
Dept: FAMILY MEDICINE CLINIC | Facility: CLINIC | Age: 78
End: 2023-07-12

## 2023-07-12 NOTE — TELEPHONE ENCOUNTER
Caller: ANA LILIA [employer]    Relationship to patient:     Best call back number: [phone]    Patient is needing: ANA LILIA FROM [employer] CALLED TO UPDATE APRN TIFFANY OF AN OTC MEDICATION THE PATIENT HAS STARTED TAKING TYLENOL 650 MG EXTENDED RELEASE TWO TIMES DAILY AS NEEDED FOR ARTHRITIS.

## 2023-07-24 ENCOUNTER — OFFICE VISIT (OUTPATIENT)
Dept: ORTHOPEDIC SURGERY | Facility: CLINIC | Age: 78
End: 2023-07-24
Payer: MEDICARE

## 2023-07-24 VITALS
BODY MASS INDEX: 30.4 KG/M2 | OXYGEN SATURATION: 94 % | HEART RATE: 85 BPM | HEIGHT: 61 IN | WEIGHT: 161 LBS | DIASTOLIC BLOOD PRESSURE: 72 MMHG | SYSTOLIC BLOOD PRESSURE: 115 MMHG

## 2023-07-24 DIAGNOSIS — S82.102D CLOSED FRACTURE OF PROXIMAL END OF LEFT TIBIA WITH ROUTINE HEALING, UNSPECIFIED FRACTURE MORPHOLOGY, SUBSEQUENT ENCOUNTER: ICD-10-CM

## 2023-07-24 DIAGNOSIS — M25.562 LEFT KNEE PAIN, UNSPECIFIED CHRONICITY: Primary | ICD-10-CM

## 2023-07-24 PROCEDURE — 3074F SYST BP LT 130 MM HG: CPT | Performed by: ORTHOPAEDIC SURGERY

## 2023-07-24 PROCEDURE — 3078F DIAST BP <80 MM HG: CPT | Performed by: ORTHOPAEDIC SURGERY

## 2023-07-24 PROCEDURE — 99213 OFFICE O/P EST LOW 20 MIN: CPT | Performed by: ORTHOPAEDIC SURGERY

## 2023-07-24 NOTE — PROGRESS NOTES
"Chief Complaint  Follow-up of the Left Knee     Subjective      Asya Burris presents to [facility] for follow up of the left knee.  She had a MRI on 7/19/23 and here today to review.  She stepped down wrong on a step.  The injury was on 7/10/23.  She is using a walker for stability.      No Known Allergies     Social History     Socioeconomic History    Marital status:    Tobacco Use    Smoking status: Never     Passive exposure: Past    Smokeless tobacco: Never   Vaping Use    Vaping Use: Never used   Substance and Sexual Activity    Alcohol use: Never    Drug use: Never    Sexual activity: Defer        Review of Systems     Objective   Vital Signs:   /72   Pulse 85   Ht 154.9 cm (61\")   Wt 73 kg (161 lb)   SpO2 94%   BMI 30.42 kg/m²       Physical Exam  Constitutional:       Appearance: Normal appearance. Patient is well-developed and normal weight.   HENT:      Head: Normocephalic.      Right Ear: Hearing and external ear normal.      Left Ear: Hearing and external ear normal.      Nose: Nose normal.   Eyes:      Conjunctiva/sclera: Conjunctivae normal.   Cardiovascular:      Rate and Rhythm: Normal rate.   Pulmonary:      Effort: Pulmonary effort is normal.      Breath sounds: No wheezing or rales.   Abdominal:      Palpations: Abdomen is soft.      Tenderness: There is no abdominal tenderness.   Musculoskeletal:      Cervical back: Normal range of motion.   Skin:     Findings: No rash.   Neurological:      Mental Status: Patient  is alert and oriented to person, place, and time.   Psychiatric:         Mood and Affect: Mood and affect normal.         Judgment: Judgment normal.       Ortho Exam        LEFT KNEE Flexion 95 Extension -3. Stable to varus/valgus stress. Stable to anterior/posterior drawer. Neurovascularly intact. Positive Marissa. Positive Lachman. Positive EHL, FHL, HS and TA. Sensation intact to light touch all 5 nerves of the foot. Ambulates " with Antalgic gait. Patella is well tracking. Calf supple, non-tender. Positive tenderness to the medial joint line. Positive tenderness to the lateral joint line. Positive Crepitus. Good strength to hamstrings, quadriceps, dorsiflexors, and plantar flexors.  Knee Extensor Mechanism intact     Procedures        Imaging Results (Most Recent)       None             Result Review :         XR Knee 3 View Left    Result Date: 7/10/2023  Narrative: PROCEDURE: XR KNEE 3 VW LEFT  COMPARISON: None.  INDICATIONS: FALL. LEFT KNEE PAIN.  FINDINGS:  3 nonweightbearing views left knee were obtained.  No acute fracture or acute malalignment is identified.  There may be a moderate to large suprapatellar recess joint effusion.  Degenerative and enthesopathic changes are present.  External artifacts obscure detail.  There are arterial calcifications.  There is generalized osteopenia.  If symptoms or clinical concerns persist, consider imaging follow-up.      Impression:   No acute fracture or acute malalignment is identified.  Please see above comments for further detail.     Please note that portions of this note were completed with a voice recognition program.  LOREN RODRIGUEZ JR, MD       Electronically Signed and Approved By: LOREN RODRIGUEZ JR, MD on 7/10/2023 at 0:18              MRI Knee Left Without Contrast    Result Date: 7/19/2023  Narrative: PROCEDURE: MRI KNEE LEFT  WO CONTRAST  COMPARISON: None  INDICATIONS: left knee injury      TECHNIQUE: A complete multi-planar MRI was performed.   FINDINGS:  There is a comminuted fracture involving the proximal tibia extending through the region of the mediolateral tibial plateaus as well as the tibial spines.  There is associated edema throughout the surrounding marrow.  The fracture extends through the articular surface of the lateral tibial plateau at multiple locations.  There is a slightly depressed fragment at the posterior margin of lateral tibial plateau which is depressed by  approximately 7 mm along the posterior rim of the lateral tibial plateau.  There is also disruption of the tibial spines.  These findings indicate potential instability at the distal ACL attachment.  No avulsed fragment is seen at this time.  Note is made of abnormal signal corresponding to the distal fibers of the ACL which suggests partial tear or possibly complete tear without retraction.  There appears to be associated abnormal laxity of the ACL.  The posterior cruciate ligament is intact.  There is evidence for high-grade partial tear to complete tear of the proximal attachment of the medial collateral ligament suggesting grade 2-3 injury.  There is associated abnormal laxity.  The components of the posterior lateral corner of the knee are intact.  The distal iliotibial band insertion is intact.  The anterior extensor mechanism of the knee is intact.  There is abnormal signal associated with the medial joint capsule indicating partial capsular injury.  There is also abnormal signal noted along the peripheral margin of the body segment of the medial meniscus which suggests meniscocapsular separation.  Abnormal signal is noted involving the posterior horn and body segment of the medial meniscus extending through the inferior articular surface indicating a horizontal type meniscal tear.  No significantly displaced fragment is seen.  There is also evidence for a subtle horizontal type meniscal tear involving the anterior horn of the lateral meniscus also extending through the inferior articular surface.  No displaced fragment is seen.  A small joint effusion is observed.  There may be an incidental plica within the patellofemoral compartment.  Edema is observed in the surrounding soft tissues.  A multilobulated Baker's cyst is noted.      Impression:   1. Comminuted mildly displaced fracture involving the proximal tibia extending through the medial lateral tibial plateaus as well as the tibial spines.  There is a  slightly depressed fracture fragment noted along the posterior rim of the lateral tibial plateau with depression of approximately 7 mm. 2. The fracture also involves the tibial spines.  No definitively avulsed fragment is observed.  The involvement of the tibial spines however suggests potential for instability of the distal ACL attachment. 3. There is also abnormal signal involving the distal fibers of the ACL suggesting partial tear or possibly complete disruption without retraction of fibers.  There is also abnormal laxity of the ACL. 4. Evidence for high-grade injury of the proximal fibers of the medial collateral ligament suggesting grade 2-3 injury.  There is associated abnormal laxity. 5. Evidence for partial injury of the medial joint capsule with associated underlying meniscocapsular separation at the peripheral margin of the medial meniscus. 6. Evidence for horizontal type meniscal tear involving the posterior horn and body segment medial meniscus extending through the inferior articular surface. 7. Evidence for horizontal type meniscal tear involving the anterior horn lateral meniscus extending through the inferior articular surface.     STEPHIE CASTANEDA MD       Electronically Signed and Approved By: STEPHIE CASTANEDA MD on 7/19/2023 at 13:54                     Assessment and Plan     Diagnoses and all orders for this visit:    1. Left knee pain, unspecified chronicity (Primary)    2. Closed fracture of proximal end of left tibia with routine healing, unspecified fracture morphology, subsequent encounter        Discussed the treatment plan with the patient. I reviewed the MRI results with the patient.     Wear brace as needed.     Work on ROM with flexion/extension.  Work back into full weight bearing as needed with the walker.     Use walker the next 4-5 weeks.      She cannot take calcium.  Her body accumulates too much.  Discussed Bone Density scan with PCP.     Will Xray the left knee at next visit.      Call or return if worsening symptoms.    Follow Up     4 weeks with X ray of the left knee.  Possibly start physical therapy after next visit.       Patient was given instructions and counseling regarding her condition or for health maintenance advice. Please see specific information pulled into the AVS if appropriate.     Scribed for Cuco Baldwin MD by Michelle Stephenson MA.  07/24/23   12:58 EDT      I have personally performed the services described in this document as scribed by the above individual and it is both accurate and complete. Cuco Baldwin MD 07/24/23

## 2023-08-28 DIAGNOSIS — K21.9 GASTROESOPHAGEAL REFLUX DISEASE, UNSPECIFIED WHETHER ESOPHAGITIS PRESENT: ICD-10-CM

## 2023-08-28 RX ORDER — OMEPRAZOLE 40 MG/1
40 CAPSULE, DELAYED RELEASE ORAL DAILY
Qty: 90 CAPSULE | Refills: 0 | OUTPATIENT
Start: 2023-08-28

## 2023-08-30 ENCOUNTER — OFFICE VISIT (OUTPATIENT)
Dept: ORTHOPEDIC SURGERY | Facility: CLINIC | Age: 78
End: 2023-08-30
Payer: MEDICARE

## 2023-08-30 VITALS — WEIGHT: 160 LBS | BODY MASS INDEX: 30.21 KG/M2 | HEIGHT: 61 IN

## 2023-08-30 DIAGNOSIS — S82.102D CLOSED FRACTURE OF PROXIMAL END OF LEFT TIBIA WITH ROUTINE HEALING, UNSPECIFIED FRACTURE MORPHOLOGY, SUBSEQUENT ENCOUNTER: ICD-10-CM

## 2023-08-30 DIAGNOSIS — S83.412D SPRAIN OF MEDIAL COLLATERAL LIGAMENT OF LEFT KNEE, SUBSEQUENT ENCOUNTER: ICD-10-CM

## 2023-08-30 DIAGNOSIS — M25.562 LEFT KNEE PAIN, UNSPECIFIED CHRONICITY: Primary | ICD-10-CM

## 2023-08-30 DIAGNOSIS — S83.512D RUPTURE OF ANTERIOR CRUCIATE LIGAMENT OF LEFT KNEE, SUBSEQUENT ENCOUNTER: ICD-10-CM

## 2023-08-30 NOTE — PROGRESS NOTES
"Chief Complaint  Pain and Follow-up of the Left Knee    Subjective      Asya Burris presents to [facility] for follow-up of left knee injury sustained on 7/10/2023.  MRI obtained on 7/19/2023 revealed a comminuted mildly displaced fracture involving the proximal tibia extending through the medial lateral tibial plateaus as well as the tibial spines with slightly depressed fracture fragment noted along the posterior rim of the lateral tibial plateau with depression of approximately 7 mm; fracture involving the tibial spines with suggestion for possible instability of the distal ACL attachment; abnormal signal involving the distal fibers of the ACL suggesting partial tear or possible complete disruption without retraction of fibers, abnormal laxity of the ACL; evidence of high-grade injury of the proximal fibers of the medial collateral ligament suggesting grade 2-3 injury; evidence for partial injury of the medial joint capsule and evidence of tear of the medial and lateral meniscus.  She was recommended to continue knee brace as needed and progress to weightbearing as tolerated with use of walker over the next 4 to 5 weeks.  She presents today for follow-up independently ambulatory without use of assistive device.  She does not have knee brace in place.  Reports that her knee is significantly improved.  She has been off of walker for some time and does not feel the need to participate in physical therapy.  States she has been able to return to most of her baseline activities/ADLs.  Denies any knee instability.    Objective   No Known Allergies    Vital Signs:   Ht 154.9 cm (61\")   Wt 72.6 kg (160 lb)   BMI 30.23 kg/mý       Physical Exam    Constitutional: Awake, alert. Well nourished appearance.    Integumentary: Warm, dry, intact. No obvious rashes.    HENT: Atraumatic, normocephalic.   Respiratory: Non labored respirations .   Cardiovascular: Intact peripheral pulses. "    Psychiatric: Normal mood and affect. A&O X3    Ortho Exam  Left knee: Skin is warm, dry, and intact.  No edema.  Patella is well tracking and knee is stable to varus and valgus stress.  Full knee extension and flexion to 120 degrees.  Full plantarflexion dorsiflexion of the ankle.  Sensation intact light touch.  Distal neurovascular intact.  Smooth sit to stand transition.  Patient fully weightbearing with nonantalgic gait.     Imaging Results (Most Recent)       Procedure Component Value Units Date/Time    XR Knee 3 View Left [[id number]] Resulted: 08/30/23 1533     Updated: 08/30/23 1534    Narrative:      X-Ray Report:  Study: X-rays ordered, taken in the office, and reviewed today.   Site: Left knee xray  Indication: Fracture  View: AP/Lateral, Standing, and [place] view(s)  Findings: No displaced fracture.  Evidence of bone healing present in the   proximal tibia/tibial spine and lateral tibial plateau.  Prior studies available for comparison: yes                       Assessment and Plan   Problem List Items Addressed This Visit    None  Visit Diagnoses       Left knee pain, unspecified chronicity    -  Primary    Relevant Orders    XR Knee 3 View Left (Completed)    Closed fracture of proximal end of left tibia with routine healing, unspecified fracture morphology, subsequent encounter        Sprain of medial collateral ligament of left knee, subsequent encounter        Rupture of anterior cruciate ligament of left knee, subsequent encounter              Follow Up   Return in about 6 weeks (around 10/11/2023).    Tobacco Use: Low Risk     Smoking Tobacco Use: Never    Smokeless Tobacco Use: Never    Passive Exposure: Past     Patient is a non-smoker.  Did not discuss tobacco cessation options.    Patient Instructions   X-rays taken and reviewed. Patient presents fully weightbearing and with full ROM. Discussed PT, which patient declines at this time. Advised continued home exercises. Continue icing and  elevation as needed for pain or swelling.     Follow up in 6 weeks. Repeat x-rays with pain only. Call with changes or concerns.   Patient was given instructions and counseling regarding her condition or for health maintenance advice. Please see specific information pulled into the AVS if appropriate.

## 2023-08-30 NOTE — PATIENT INSTRUCTIONS
X-rays taken and reviewed. Patient presents fully weightbearing and with full ROM. Discussed PT, which patient declines at this time. Advised continued home exercises. Continue icing and elevation as needed for pain or swelling.     Follow up in 6 weeks. Repeat x-rays with pain only. Call with changes or concerns.

## 2023-10-11 ENCOUNTER — OFFICE VISIT (OUTPATIENT)
Dept: ORTHOPEDIC SURGERY | Facility: CLINIC | Age: 78
End: 2023-10-11
Payer: MEDICARE

## 2023-10-11 VITALS
HEART RATE: 86 BPM | DIASTOLIC BLOOD PRESSURE: 87 MMHG | HEIGHT: 61 IN | BODY MASS INDEX: 30.21 KG/M2 | OXYGEN SATURATION: 96 % | WEIGHT: 160 LBS | SYSTOLIC BLOOD PRESSURE: 154 MMHG

## 2023-10-11 DIAGNOSIS — S82.102D CLOSED FRACTURE OF PROXIMAL END OF LEFT TIBIA WITH ROUTINE HEALING, UNSPECIFIED FRACTURE MORPHOLOGY, SUBSEQUENT ENCOUNTER: Primary | ICD-10-CM

## 2023-10-11 NOTE — PROGRESS NOTES
"Chief Complaint  Pain and Follow-up of the Left Knee    Subjective      Asya Burris presents to [facility] for follow-up of left knee injury sustained on 7/10/2023.  MRI revealed a comminuted mildly displaced fracture of the proximal tibia extending through the medial lateral tibial plateaus as well as the tibial spines with slightly depressed fracture fragment noted along the posterior rim of the lateral tibial plateau with depression of approximately 7 mm; fracture involving the tibial spines with suggestion of possible instability of the distal ACL attachment; abnormal signal involving the distal fibers of the ACL suggesting partial tear or possible complete disruption without retraction of fibers, abnormal laxity of the ACL; evidence of high-grade injury of the proximal fibers of the medial collateral ligament suggesting grade 2-3 injury; evidence for partial injury of the medial joint capsule and evidence of tear of the medial and lateral meniscus.  Patient was treated conservatively/nonoperatively with a knee brace being, progressed to weightbearing as tolerated.  She has elected to continue home exercise program.  She presents independently ambulatory without use of brace.  She states she has had no pain, only \"occasional twinges\" with going up or down stairs.  Does report that she is cautious on stairs.  Denies any feeling of knee instability.      Objective   No Known Allergies    Vital Signs:   /87   Pulse 86   Ht 154.9 cm (61\")   Wt 72.6 kg (160 lb)   SpO2 96%   BMI 30.23 kg/mý       Physical Exam    Constitutional: Awake, alert. Well nourished appearance.    Integumentary: Warm, dry, intact. No obvious rashes.    HENT: Atraumatic, normocephalic.   Respiratory: Non labored respirations .   Cardiovascular: Intact peripheral pulses.    Psychiatric: Normal mood and affect. A&O X3    Ortho Exam  Left knee: Skin is warm, dry, and intact.  No tenderness to palpation. "  Full knee extension and flexion to 125 degrees.  Full plantarflexion dorsiflexion of the ankle.  Sensation intact light touch.  Distal neurovascular intact.  Smooth sit to stand transition.  Patient is fully weightbearing with nonantalgic gait.  Negative Lachman's.    Imaging Results (Most Recent)       Procedure Component Value Units Date/Time    XR Knee 3 View Left [[id number]] Resulted: 10/11/23 1012     Updated: 10/11/23 1012    Narrative:      X-Ray Report:  Study: X-rays ordered, taken in the office, and reviewed today.   Site: Left knee Xray  Indication: Fracture  View: AP/Lateral, Standing, and [place] view(s)  Findings: No acute or displaced fractures.  Prior studies available for comparison: yes                     Assessment and Plan   Problem List Items Addressed This Visit    None  Visit Diagnoses       Closed fracture of proximal end of left tibia with routine healing, unspecified fracture morphology, subsequent encounter    -  Primary    Relevant Orders    XR Knee 3 View Left (Completed)            Follow Up   Return if symptoms worsen or fail to improve.    Tobacco Use: Low Risk  (10/11/2023)    Patient History     Smoking Tobacco Use: Never     Smokeless Tobacco Use: Never     Passive Exposure: Past     Patient is a non-smoker.  Did not discuss tobacco cessation options.    Patient Instructions   X-rays taken and reviewed. Patient is progressing well. She continues to decline PT. Advised to continue home exercises. She declines significant knee instability.     Continue icing knee as needed up to 4 times daily for no longer than 15-20 mins at a time.     Follow up as needed. Call with changes or concerns.     Patient was given instructions and counseling regarding her condition or for health maintenance advice. Please see specific information pulled into the AVS if appropriate.

## 2023-10-11 NOTE — PATIENT INSTRUCTIONS
X-rays taken and reviewed. Patient is progressing well. She continues to decline PT. Advised to continue home exercises. She declines significant knee instability.     Continue icing knee as needed up to 4 times daily for no longer than 15-20 mins at a time.     Follow up as needed. Call with changes or concerns.

## 2023-10-13 ENCOUNTER — HOSPITAL ENCOUNTER (EMERGENCY)
Facility: HOSPITAL | Age: 78
Discharge: LEFT BEFORE DISCHARGE | End: 2023-10-13
Payer: MEDICARE

## 2023-10-13 ENCOUNTER — OFFICE VISIT (OUTPATIENT)
Dept: FAMILY MEDICINE CLINIC | Facility: CLINIC | Age: 78
End: 2023-10-13
Payer: MEDICARE

## 2023-10-13 ENCOUNTER — APPOINTMENT (OUTPATIENT)
Dept: GENERAL RADIOLOGY | Facility: HOSPITAL | Age: 78
End: 2023-10-13
Payer: MEDICARE

## 2023-10-13 VITALS
HEIGHT: 61 IN | BODY MASS INDEX: 30.21 KG/M2 | OXYGEN SATURATION: 94 % | HEART RATE: 99 BPM | SYSTOLIC BLOOD PRESSURE: 155 MMHG | TEMPERATURE: 100.5 F | DIASTOLIC BLOOD PRESSURE: 84 MMHG | WEIGHT: 160 LBS | RESPIRATION RATE: 16 BRPM

## 2023-10-13 VITALS
BODY MASS INDEX: 30.21 KG/M2 | HEART RATE: 127 BPM | HEIGHT: 61 IN | WEIGHT: 160 LBS | SYSTOLIC BLOOD PRESSURE: 130 MMHG | OXYGEN SATURATION: 94 % | DIASTOLIC BLOOD PRESSURE: 82 MMHG | TEMPERATURE: 96.8 F

## 2023-10-13 DIAGNOSIS — G47.00 INSOMNIA, UNSPECIFIED TYPE: ICD-10-CM

## 2023-10-13 DIAGNOSIS — F41.9 ANXIETY DISORDER, UNSPECIFIED TYPE: ICD-10-CM

## 2023-10-13 DIAGNOSIS — R06.00 DYSPNEA, UNSPECIFIED TYPE: Primary | ICD-10-CM

## 2023-10-13 DIAGNOSIS — K21.9 GASTROESOPHAGEAL REFLUX DISEASE, UNSPECIFIED WHETHER ESOPHAGITIS PRESENT: ICD-10-CM

## 2023-10-13 DIAGNOSIS — I10 BENIGN ESSENTIAL HYPERTENSION: ICD-10-CM

## 2023-10-13 LAB
ALBUMIN SERPL-MCNC: 4.4 G/DL (ref 3.5–5.2)
ALBUMIN/GLOB SERPL: 1.1 G/DL
ALP SERPL-CCNC: 70 U/L (ref 39–117)
ALT SERPL W P-5'-P-CCNC: 15 U/L (ref 1–33)
ANION GAP SERPL CALCULATED.3IONS-SCNC: 12.2 MMOL/L (ref 5–15)
AST SERPL-CCNC: 24 U/L (ref 1–32)
BASOPHILS # BLD AUTO: 0.06 10*3/MM3 (ref 0–0.2)
BASOPHILS NFR BLD AUTO: 0.5 % (ref 0–1.5)
BILIRUB SERPL-MCNC: 0.5 MG/DL (ref 0–1.2)
BUN SERPL-MCNC: 17 MG/DL (ref 8–23)
BUN/CREAT SERPL: 19.1 (ref 7–25)
CALCIUM SPEC-SCNC: 10.6 MG/DL (ref 8.6–10.5)
CHLORIDE SERPL-SCNC: 96 MMOL/L (ref 98–107)
CO2 SERPL-SCNC: 24.8 MMOL/L (ref 22–29)
CREAT SERPL-MCNC: 0.89 MG/DL (ref 0.57–1)
DEPRECATED RDW RBC AUTO: 43.1 FL (ref 37–54)
EGFRCR SERPLBLD CKD-EPI 2021: 67.3 ML/MIN/1.73
EOSINOPHIL # BLD AUTO: 0.12 10*3/MM3 (ref 0–0.4)
EOSINOPHIL NFR BLD AUTO: 1 % (ref 0.3–6.2)
ERYTHROCYTE [DISTWIDTH] IN BLOOD BY AUTOMATED COUNT: 14.7 % (ref 12.3–15.4)
GLOBULIN UR ELPH-MCNC: 4 GM/DL
GLUCOSE SERPL-MCNC: 113 MG/DL (ref 65–99)
HCT VFR BLD AUTO: 40.8 % (ref 34–46.6)
HGB BLD-MCNC: 12.4 G/DL (ref 12–15.9)
HOLD SPECIMEN: NORMAL
HOLD SPECIMEN: NORMAL
IMM GRANULOCYTES # BLD AUTO: 0.04 10*3/MM3 (ref 0–0.05)
IMM GRANULOCYTES NFR BLD AUTO: 0.3 % (ref 0–0.5)
LYMPHOCYTES # BLD AUTO: 2.97 10*3/MM3 (ref 0.7–3.1)
LYMPHOCYTES NFR BLD AUTO: 25.3 % (ref 19.6–45.3)
MCH RBC QN AUTO: 24.7 PG (ref 26.6–33)
MCHC RBC AUTO-ENTMCNC: 30.4 G/DL (ref 31.5–35.7)
MCV RBC AUTO: 81.3 FL (ref 79–97)
MONOCYTES # BLD AUTO: 0.72 10*3/MM3 (ref 0.1–0.9)
MONOCYTES NFR BLD AUTO: 6.1 % (ref 5–12)
NEUTROPHILS NFR BLD AUTO: 66.8 % (ref 42.7–76)
NEUTROPHILS NFR BLD AUTO: 7.83 10*3/MM3 (ref 1.7–7)
NRBC BLD AUTO-RTO: 0 /100 WBC (ref 0–0.2)
NT-PROBNP SERPL-MCNC: 117.4 PG/ML (ref 0–1800)
PLATELET # BLD AUTO: 272 10*3/MM3 (ref 140–450)
PMV BLD AUTO: 10.3 FL (ref 6–12)
POTASSIUM SERPL-SCNC: 4.1 MMOL/L (ref 3.5–5.2)
PROT SERPL-MCNC: 8.4 G/DL (ref 6–8.5)
RBC # BLD AUTO: 5.02 10*6/MM3 (ref 3.77–5.28)
SODIUM SERPL-SCNC: 133 MMOL/L (ref 136–145)
TROPONIN T SERPL HS-MCNC: 10 NG/L
WBC NRBC COR # BLD: 11.74 10*3/MM3 (ref 3.4–10.8)
WHOLE BLOOD HOLD COAG: NORMAL
WHOLE BLOOD HOLD SPECIMEN: NORMAL

## 2023-10-13 PROCEDURE — 1160F RVW MEDS BY RX/DR IN RCRD: CPT | Performed by: NURSE PRACTITIONER

## 2023-10-13 PROCEDURE — 1159F MED LIST DOCD IN RCRD: CPT | Performed by: NURSE PRACTITIONER

## 2023-10-13 PROCEDURE — 71045 X-RAY EXAM CHEST 1 VIEW: CPT

## 2023-10-13 PROCEDURE — 83880 ASSAY OF NATRIURETIC PEPTIDE: CPT

## 2023-10-13 PROCEDURE — 93005 ELECTROCARDIOGRAM TRACING: CPT

## 2023-10-13 PROCEDURE — 99211 OFF/OP EST MAY X REQ PHY/QHP: CPT

## 2023-10-13 PROCEDURE — 99214 OFFICE O/P EST MOD 30 MIN: CPT | Performed by: NURSE PRACTITIONER

## 2023-10-13 PROCEDURE — 84484 ASSAY OF TROPONIN QUANT: CPT

## 2023-10-13 PROCEDURE — 3079F DIAST BP 80-89 MM HG: CPT | Performed by: NURSE PRACTITIONER

## 2023-10-13 PROCEDURE — 93010 ELECTROCARDIOGRAM REPORT: CPT | Performed by: INTERNAL MEDICINE

## 2023-10-13 PROCEDURE — 93000 ELECTROCARDIOGRAM COMPLETE: CPT | Performed by: NURSE PRACTITIONER

## 2023-10-13 PROCEDURE — 36415 COLL VENOUS BLD VENIPUNCTURE: CPT

## 2023-10-13 PROCEDURE — 80053 COMPREHEN METABOLIC PANEL: CPT

## 2023-10-13 PROCEDURE — 3075F SYST BP GE 130 - 139MM HG: CPT | Performed by: NURSE PRACTITIONER

## 2023-10-13 PROCEDURE — 85025 COMPLETE CBC W/AUTO DIFF WBC: CPT

## 2023-10-13 RX ORDER — OMEPRAZOLE 40 MG/1
40 CAPSULE, DELAYED RELEASE ORAL DAILY
Qty: 90 CAPSULE | Refills: 0 | Status: SHIPPED | OUTPATIENT
Start: 2023-10-13

## 2023-10-13 RX ORDER — SODIUM CHLORIDE 0.9 % (FLUSH) 0.9 %
10 SYRINGE (ML) INJECTION AS NEEDED
Status: DISCONTINUED | OUTPATIENT
Start: 2023-10-13 | End: 2023-10-14 | Stop reason: HOSPADM

## 2023-10-13 RX ORDER — TRAZODONE HYDROCHLORIDE 50 MG/1
50 TABLET ORAL NIGHTLY
Qty: 90 TABLET | Refills: 0 | Status: SHIPPED | OUTPATIENT
Start: 2023-10-13

## 2023-10-13 RX ORDER — LOSARTAN POTASSIUM 100 MG/1
100 TABLET ORAL DAILY
Qty: 90 TABLET | Refills: 0 | Status: SHIPPED | OUTPATIENT
Start: 2023-10-13

## 2023-10-13 RX ORDER — BUPROPION HYDROCHLORIDE 150 MG/1
150 TABLET ORAL EVERY MORNING
Qty: 90 TABLET | Refills: 0 | Status: SHIPPED | OUTPATIENT
Start: 2023-10-13

## 2023-10-13 NOTE — PROGRESS NOTES
Chief Complaint  Hypertension (Refill medications)    Subjective        Past Medical History:   Diagnosis Date    Arthritis     Cervical disc disorder     CTS (carpal tunnel syndrome)     GERD (gastroesophageal reflux disease)     Hypertension     Low back pain      Social History     Tobacco Use    Smoking status: Never     Passive exposure: Past    Smokeless tobacco: Never   Vaping Use    Vaping Use: Never used   Substance Use Topics    Alcohol use: Never    Drug use: Never      Current Outpatient Medications on File Prior to Visit   Medication Sig    [DISCONTINUED] buPROPion XL (WELLBUTRIN XL) 150 MG 24 hr tablet TAKE 1 TABLET BY MOUTH ONCE DAILY IN THE MORNING    [DISCONTINUED] losartan (COZAAR) 100 MG tablet Take 1 tablet by mouth once daily    [DISCONTINUED] omeprazole (priLOSEC) 40 MG capsule Take 1 capsule by mouth once daily    [DISCONTINUED] sertraline (ZOLOFT) 50 MG tablet TAKE 1 & 1/2 (ONE & ONE-HALF) TABLETS BY MOUTH ONCE DAILY    [DISCONTINUED] traZODone (DESYREL) 50 MG tablet TAKE 1 TABLET BY MOUTH ONCE DAILY AT NIGHT    [DISCONTINUED] albuterol sulfate  (90 Base) MCG/ACT inhaler Inhale 2 puffs Every 4 (Four) Hours As Needed for Wheezing.    [DISCONTINUED] Diclofenac Sodium (VOLTAREN) 1 % gel gel Apply 4 g topically to the appropriate area as directed 4 (Four) Times a Day As Needed (elbow discomfort and pain).    [DISCONTINUED] HYDROcodone-acetaminophen (NORCO) 5-325 MG per tablet Take 1 tablet by mouth Every 6 (Six) Hours As Needed. for pain (Patient not taking: Reported on 8/30/2023)     No current facility-administered medications on file prior to visit.      No Known Allergies   Health Maintenance Due   Topic Date Due    TDAP/TD VACCINES (1 - Tdap) Never done    ZOSTER VACCINE (1 of 2) Never done    Pneumococcal Vaccine 65+ (1 - PCV) Never done    DXA SCAN  04/06/2023    INFLUENZA VACCINE  08/01/2023    COVID-19 Vaccine (4 - 2023-24 season) 09/01/2023      Asya FARRIS  "Fatuma presents to [facility]  Hypertension  Associated symptoms include shortness of breath.     Here to follow up on chronic health conditions and obtain refills. Pt has been out of medication for about 2 weeks.     HTN: normotensive in office.    Anxiety and depression: pt has been out of medication.     Notes shortness of breath, especially with stairs and vacuuming. Has been ongoing and notes coughing with mucus production that is yellow especially in the morning. Increased cough like a tickle or irritation of the throat. Denies nausea or vomiting. No hx of smoking or second hand smoke exposure.       Objective   Vital Signs:   /82 (BP Location: Left arm)   Pulse (!) 127   Temp 96.8 øF (36 øC)   Ht 154.9 cm (61\")   Wt 72.6 kg (160 lb)   SpO2 94%   BMI 30.23 kg/mý     Review of Systems   Respiratory:  Positive for cough, chest tightness and shortness of breath.    Gastrointestinal:  Negative for blood in stool, diarrhea, nausea and vomiting.      Physical Exam  Vitals reviewed.   Constitutional:       General: She is in acute distress.      Appearance: Normal appearance. She is well-developed.   HENT:      Head: Normocephalic and atraumatic.   Eyes:      Conjunctiva/sclera: Conjunctivae normal.      Pupils: Pupils are equal, round, and reactive to light.   Cardiovascular:      Rate and Rhythm: Normal rate and regular rhythm.      Heart sounds: Normal heart sounds.   Pulmonary:      Effort: Pulmonary effort is normal. Respiratory distress present.      Breath sounds: Normal breath sounds.   Musculoskeletal:      Cervical back: Neck supple.      Right lower leg: No edema.      Left lower leg: No edema.   Skin:     General: Skin is warm and dry.   Neurological:      Mental Status: She is alert and oriented to person, place, and time.      Cranial Nerves: No cranial nerve deficit.   Psychiatric:         Mood and Affect: Mood and affect normal. Affect is tearful.         " Behavior: Behavior normal.         Thought Content: Thought content normal.         Judgment: Judgment normal.      Result Review :   The following data was reviewed by: NAMAN New on 10/13/2023:  Common labs          11/4/2022    09:14 11/8/2022    13:21 1/26/2023    10:40   Common Labs   Glucose 126   75    BUN 18   23    Creatinine 1.10   0.98    Sodium 139   139    Potassium 4.9   4.0    Chloride 99   102    Calcium 10.7   10.0    Albumin 4.70   4.7    Total Bilirubin 0.3   0.3    Alkaline Phosphatase 61   60    AST (SGOT) 30   26    ALT (SGPT) 15   13    WBC   6.97    Hemoglobin   12.2    Hematocrit   38.4    Platelets   220    Total Cholesterol 221      Triglycerides 95      HDL Cholesterol 54      LDL Cholesterol  150      Hemoglobin A1C  6.70       TSH          11/4/2022    09:14   TSH   TSH 2.040      Data reviewed : Radiologic studies CXR      ECG 12 Lead    Date/Time: 10/13/2023 2:31 PM  Performed by: Kaylee Floyd APRN    Authorized by: Kaylee Floyd APRN  Comparison: not compared with previous ECG   Rhythm: sinus tachycardia  Rate: tachycardic    Clinical impression: abnormal EKG          Assessment and Plan    Diagnoses and all orders for this visit:    1. Dyspnea, unspecified type (Primary)  -     ECG 12 Lead  -     CBC & Differential  -     Comprehensive Metabolic Panel  -     XR Chest PA & Lateral (In Office)  -     BNP    2. Anxiety disorder, unspecified type  -     buPROPion XL (WELLBUTRIN XL) 150 MG 24 hr tablet; Take 1 tablet by mouth Every Morning.  Dispense: 90 tablet; Refill: 0  -     sertraline (ZOLOFT) 50 MG tablet; Take 1.5 tablets by mouth Daily.  Dispense: 135 tablet; Refill: 0    3. Benign essential hypertension  -     losartan (COZAAR) 100 MG tablet; Take 1 tablet by mouth Daily.  Dispense: 90 tablet; Refill: 0  -     Lipid Panel  -     TSH Rfx On Abnormal To Free T4  -     Urinalysis With Culture If Indicated -    4. Gastroesophageal reflux disease, unspecified  whether esophagitis present  -     omeprazole (priLOSEC) 40 MG capsule; Take 1 capsule by mouth Daily.  Dispense: 90 capsule; Refill: 0    5. Insomnia, unspecified type  -     traZODone (DESYREL) 50 MG tablet; Take 1 tablet by mouth Every Night.  Dispense: 90 tablet; Refill: 0      We will refill current medications.    Chest x-ray with similar appearance to January 2023.  Patient in respiratory distress and tearful in room and with mild activity.  Recommend patient to go to the ER, daughter states that she will take her to the ER for further evaluation.  EKG done and sinus tachycardia noted.    Follow Up   Return for after discharge.  Patient was given instructions and counseling regarding her condition or for health maintenance advice. Please see specific information pulled into the AVS if appropriate.

## 2023-10-14 LAB
QT INTERVAL: 363 MS
QTC INTERVAL: 461 MS

## 2024-04-04 ENCOUNTER — LAB (OUTPATIENT)
Dept: LAB | Facility: HOSPITAL | Age: 79
End: 2024-04-04
Payer: MEDICARE

## 2024-04-04 ENCOUNTER — TRANSCRIBE ORDERS (OUTPATIENT)
Dept: ADMINISTRATIVE | Facility: HOSPITAL | Age: 79
End: 2024-04-04
Payer: MEDICARE

## 2024-04-04 DIAGNOSIS — N18.31 STAGE 3A CHRONIC KIDNEY DISEASE: Primary | ICD-10-CM

## 2024-04-04 DIAGNOSIS — N18.31 STAGE 3A CHRONIC KIDNEY DISEASE: ICD-10-CM

## 2024-04-04 LAB
ANION GAP SERPL CALCULATED.3IONS-SCNC: 12 MMOL/L (ref 5–15)
BILIRUB UR QL STRIP: NEGATIVE
BUN SERPL-MCNC: 19 MG/DL (ref 8–23)
BUN/CREAT SERPL: 17.6 (ref 7–25)
CALCIUM SPEC-SCNC: 9.7 MG/DL (ref 8.6–10.5)
CHLORIDE SERPL-SCNC: 102 MMOL/L (ref 98–107)
CLARITY UR: CLEAR
CO2 SERPL-SCNC: 23 MMOL/L (ref 22–29)
COLOR UR: YELLOW
CREAT SERPL-MCNC: 1.08 MG/DL (ref 0.57–1)
CREAT UR-MCNC: 99.6 MG/DL
EGFRCR SERPLBLD CKD-EPI 2021: 53 ML/MIN/1.73
GLUCOSE SERPL-MCNC: 218 MG/DL (ref 65–99)
GLUCOSE UR STRIP-MCNC: NEGATIVE MG/DL
HGB UR QL STRIP.AUTO: NEGATIVE
HOLD SPECIMEN: NORMAL
KETONES UR QL STRIP: NEGATIVE
LEUKOCYTE ESTERASE UR QL STRIP.AUTO: NEGATIVE
NITRITE UR QL STRIP: NEGATIVE
PH UR STRIP.AUTO: 6 [PH] (ref 5–8)
POTASSIUM SERPL-SCNC: 4 MMOL/L (ref 3.5–5.2)
PROT ?TM UR-MCNC: 10.8 MG/DL
PROT UR QL STRIP: NEGATIVE
PROT/CREAT UR: 0.11 MG/G{CREAT}
SODIUM SERPL-SCNC: 137 MMOL/L (ref 136–145)
SP GR UR STRIP: 1.02 (ref 1–1.03)
UROBILINOGEN UR QL STRIP: NORMAL

## 2024-04-04 PROCEDURE — 82570 ASSAY OF URINE CREATININE: CPT

## 2024-04-04 PROCEDURE — 36415 COLL VENOUS BLD VENIPUNCTURE: CPT

## 2024-04-04 PROCEDURE — 84156 ASSAY OF PROTEIN URINE: CPT

## 2024-04-04 PROCEDURE — 80048 BASIC METABOLIC PNL TOTAL CA: CPT

## 2024-04-04 PROCEDURE — 81003 URINALYSIS AUTO W/O SCOPE: CPT

## 2024-05-07 ENCOUNTER — LAB (OUTPATIENT)
Dept: LAB | Facility: HOSPITAL | Age: 79
End: 2024-05-07
Payer: MEDICARE

## 2024-05-07 ENCOUNTER — TRANSCRIBE ORDERS (OUTPATIENT)
Dept: ADMINISTRATIVE | Facility: HOSPITAL | Age: 79
End: 2024-05-07
Payer: MEDICARE

## 2024-05-07 DIAGNOSIS — H02.88A MEIBOMIAN GLAND DYSFUNCTION (MGD) OF UPPER AND LOWER LIDS OF BOTH EYES: ICD-10-CM

## 2024-05-07 DIAGNOSIS — H04.123 DRY EYE SYNDROME OF BILATERAL LACRIMAL GLANDS: Primary | ICD-10-CM

## 2024-05-07 DIAGNOSIS — H02.834 DERMATOCHALASIS OF LEFT UPPER EYELID: ICD-10-CM

## 2024-05-07 DIAGNOSIS — H02.831 DERMATOCHALASIS OF UPPER EYELID, RIGHT: ICD-10-CM

## 2024-05-07 DIAGNOSIS — H02.88B MEIBOMIAN GLAND DYSFUNCTION (MGD) OF UPPER AND LOWER LIDS OF BOTH EYES: ICD-10-CM

## 2024-05-07 DIAGNOSIS — H04.123 DRY EYE SYNDROME OF BILATERAL LACRIMAL GLANDS: ICD-10-CM

## 2024-05-07 PROCEDURE — 86255 FLUORESCENT ANTIBODY SCREEN: CPT

## 2024-05-07 PROCEDURE — 86043 ACETYLCHOLN RCPTR MODLG ANTB: CPT

## 2024-05-07 PROCEDURE — 86041 ACETYLCHOLN RCPTR BNDNG ANTB: CPT

## 2024-05-07 PROCEDURE — 86042 ACETYLCHOLN RCPTR BLCKG ANTB: CPT

## 2024-05-07 PROCEDURE — 36415 COLL VENOUS BLD VENIPUNCTURE: CPT

## 2024-05-22 LAB
ACHR BIND AB SER-SCNC: 0.04 NMOL/L (ref 0–0.24)
ACHR BLOCK AB SER-ACNC: 22 % (ref 0–25)
ACHR MOD AB SER QL FC: 6 % (ref 0–45)
MUSK AB SER IA-ACNC: <1 U/ML
REFLEX INFORMATION: NORMAL
STRIA MUS AB TITR SER IF: NEGATIVE {TITER}

## 2024-09-25 ENCOUNTER — TRANSCRIBE ORDERS (OUTPATIENT)
Dept: SLEEP MEDICINE | Facility: HOSPITAL | Age: 79
End: 2024-09-25
Payer: MEDICARE

## 2024-09-25 DIAGNOSIS — G47.33 OSA (OBSTRUCTIVE SLEEP APNEA): Primary | ICD-10-CM

## 2024-11-14 ENCOUNTER — APPOINTMENT (OUTPATIENT)
Dept: GENERAL RADIOLOGY | Facility: HOSPITAL | Age: 79
End: 2024-11-14
Payer: MEDICARE

## 2024-11-14 ENCOUNTER — HOSPITAL ENCOUNTER (EMERGENCY)
Facility: HOSPITAL | Age: 79
Discharge: HOME | End: 2024-11-14
Attending: EMERGENCY MEDICINE
Payer: MEDICARE

## 2024-11-14 VITALS
HEART RATE: 87 BPM | SYSTOLIC BLOOD PRESSURE: 132 MMHG | WEIGHT: 167.8 LBS | RESPIRATION RATE: 16 BRPM | TEMPERATURE: 99.5 F | OXYGEN SATURATION: 95 % | DIASTOLIC BLOOD PRESSURE: 86 MMHG | HEIGHT: 61 IN | BODY MASS INDEX: 31.68 KG/M2

## 2024-11-14 DIAGNOSIS — R06.00 DYSPNEA, UNSPECIFIED TYPE: ICD-10-CM

## 2024-11-14 DIAGNOSIS — J84.10 PULMONARY FIBROSIS: Primary | ICD-10-CM

## 2024-11-14 LAB
ALBUMIN SERPL-MCNC: 4.2 G/DL (ref 3.5–5.2)
ALBUMIN/GLOB SERPL: 1.1 G/DL
ALP SERPL-CCNC: 80 U/L (ref 39–117)
ALT SERPL W P-5'-P-CCNC: 14 U/L (ref 1–33)
ANION GAP SERPL CALCULATED.3IONS-SCNC: 13.3 MMOL/L (ref 5–15)
AST SERPL-CCNC: 26 U/L (ref 1–32)
BASOPHILS # BLD AUTO: 0.03 10*3/MM3 (ref 0–0.2)
BASOPHILS NFR BLD AUTO: 0.6 % (ref 0–1.5)
BILIRUB SERPL-MCNC: 0.4 MG/DL (ref 0–1.2)
BUN SERPL-MCNC: 14 MG/DL (ref 8–23)
BUN/CREAT SERPL: 14.9 (ref 7–25)
CALCIUM SPEC-SCNC: 9.8 MG/DL (ref 8.6–10.5)
CHLORIDE SERPL-SCNC: 99 MMOL/L (ref 98–107)
CO2 SERPL-SCNC: 24.7 MMOL/L (ref 22–29)
CREAT SERPL-MCNC: 0.94 MG/DL (ref 0.57–1)
D-LACTATE SERPL-SCNC: 1.4 MMOL/L (ref 0.5–2)
DEPRECATED RDW RBC AUTO: 42.9 FL (ref 37–54)
EGFRCR SERPLBLD CKD-EPI 2021: 62.6 ML/MIN/1.73
EOSINOPHIL # BLD AUTO: 0.17 10*3/MM3 (ref 0–0.4)
EOSINOPHIL NFR BLD AUTO: 3.3 % (ref 0.3–6.2)
ERYTHROCYTE [DISTWIDTH] IN BLOOD BY AUTOMATED COUNT: 14.8 % (ref 12.3–15.4)
FLUAV SUBTYP SPEC NAA+PROBE: NOT DETECTED
FLUBV RNA ISLT QL NAA+PROBE: NOT DETECTED
GLOBULIN UR ELPH-MCNC: 3.8 GM/DL
GLUCOSE SERPL-MCNC: 147 MG/DL (ref 65–99)
HCT VFR BLD AUTO: 38.9 % (ref 34–46.6)
HGB BLD-MCNC: 11.7 G/DL (ref 12–15.9)
HOLD SPECIMEN: NORMAL
HOLD SPECIMEN: NORMAL
IMM GRANULOCYTES # BLD AUTO: 0.02 10*3/MM3 (ref 0–0.05)
IMM GRANULOCYTES NFR BLD AUTO: 0.4 % (ref 0–0.5)
LYMPHOCYTES # BLD AUTO: 1.25 10*3/MM3 (ref 0.7–3.1)
LYMPHOCYTES NFR BLD AUTO: 24.3 % (ref 19.6–45.3)
MCH RBC QN AUTO: 24 PG (ref 26.6–33)
MCHC RBC AUTO-ENTMCNC: 30.1 G/DL (ref 31.5–35.7)
MCV RBC AUTO: 79.7 FL (ref 79–97)
MONOCYTES # BLD AUTO: 0.3 10*3/MM3 (ref 0.1–0.9)
MONOCYTES NFR BLD AUTO: 5.8 % (ref 5–12)
NEUTROPHILS NFR BLD AUTO: 3.37 10*3/MM3 (ref 1.7–7)
NEUTROPHILS NFR BLD AUTO: 65.6 % (ref 42.7–76)
NRBC BLD AUTO-RTO: 0 /100 WBC (ref 0–0.2)
NT-PROBNP SERPL-MCNC: 87.1 PG/ML (ref 0–1800)
PLATELET # BLD AUTO: 204 10*3/MM3 (ref 140–450)
PMV BLD AUTO: 10.5 FL (ref 6–12)
POTASSIUM SERPL-SCNC: 3.9 MMOL/L (ref 3.5–5.2)
PROT SERPL-MCNC: 8 G/DL (ref 6–8.5)
QT INTERVAL: 365 MS
QTC INTERVAL: 462 MS
RBC # BLD AUTO: 4.88 10*6/MM3 (ref 3.77–5.28)
RSV RNA NPH QL NAA+NON-PROBE: NOT DETECTED
SARS-COV-2 RNA RESP QL NAA+PROBE: NOT DETECTED
SODIUM SERPL-SCNC: 137 MMOL/L (ref 136–145)
TROPONIN T SERPL HS-MCNC: 13 NG/L
WBC NRBC COR # BLD AUTO: 5.14 10*3/MM3 (ref 3.4–10.8)
WHOLE BLOOD HOLD COAG: NORMAL
WHOLE BLOOD HOLD SPECIMEN: NORMAL

## 2024-11-14 PROCEDURE — 80053 COMPREHEN METABOLIC PANEL: CPT

## 2024-11-14 PROCEDURE — 83880 ASSAY OF NATRIURETIC PEPTIDE: CPT

## 2024-11-14 PROCEDURE — 87040 BLOOD CULTURE FOR BACTERIA: CPT

## 2024-11-14 PROCEDURE — 87637 SARSCOV2&INF A&B&RSV AMP PRB: CPT | Performed by: EMERGENCY MEDICINE

## 2024-11-14 PROCEDURE — 93005 ELECTROCARDIOGRAM TRACING: CPT | Performed by: EMERGENCY MEDICINE

## 2024-11-14 PROCEDURE — 36415 COLL VENOUS BLD VENIPUNCTURE: CPT

## 2024-11-14 PROCEDURE — 83605 ASSAY OF LACTIC ACID: CPT

## 2024-11-14 PROCEDURE — 99284 EMERGENCY DEPT VISIT MOD MDM: CPT

## 2024-11-14 PROCEDURE — 84484 ASSAY OF TROPONIN QUANT: CPT

## 2024-11-14 PROCEDURE — 93005 ELECTROCARDIOGRAM TRACING: CPT

## 2024-11-14 PROCEDURE — 71045 X-RAY EXAM CHEST 1 VIEW: CPT

## 2024-11-14 PROCEDURE — 85025 COMPLETE CBC W/AUTO DIFF WBC: CPT

## 2024-11-14 RX ORDER — ALBUTEROL SULFATE 90 UG/1
INHALANT RESPIRATORY (INHALATION)
COMMUNITY
Start: 2024-11-06

## 2024-11-14 RX ORDER — LIFITEGRAST 50 MG/ML
SOLUTION/ DROPS OPHTHALMIC
COMMUNITY
Start: 2024-09-30

## 2024-11-14 RX ORDER — PREDNISONE 10 MG/1
TABLET ORAL
Qty: 57 TABLET | Refills: 0 | Status: SHIPPED | OUTPATIENT
Start: 2024-11-14 | End: 2024-11-26

## 2024-11-14 RX ORDER — BUDESONIDE AND FORMOTEROL FUMARATE 80; 4.5 UG/1; UG/1
2 AEROSOL, METERED RESPIRATORY (INHALATION) 2 TIMES DAILY
COMMUNITY
Start: 2024-08-15

## 2024-11-14 RX ORDER — SODIUM CHLORIDE 0.9 % (FLUSH) 0.9 %
10 SYRINGE (ML) INJECTION AS NEEDED
Status: DISCONTINUED | OUTPATIENT
Start: 2024-11-14 | End: 2024-11-14 | Stop reason: HOSPADM

## 2024-11-14 RX ORDER — MIRTAZAPINE 30 MG/1
TABLET, FILM COATED ORAL
COMMUNITY
Start: 2024-10-01

## 2024-11-14 RX ORDER — FLUTICASONE PROPIONATE 50 MCG
SPRAY, SUSPENSION (ML) NASAL
COMMUNITY
Start: 2024-11-06

## 2024-11-14 NOTE — ED PROVIDER NOTES
Time: 2:00 PM EST  Date of encounter:  11/14/2024  Independent Historian/Clinical History and Information was obtained by:   Patient    History is limited by: N/A    Chief Complaint: Short of breath      History of Present Illness:  Patient is a 78 y.o. year old female who presents to the emergency department for evaluation of shortness of breath and cough.  Patient has history of pulmonary fibrosis.  She does not have home oxygen.      Patient Care Team  Primary Care Provider: Ed Beth MD    Past Medical History:     No Known Allergies  Past Medical History:   Diagnosis Date    Arthritis     Cervical disc disorder     CTS (carpal tunnel syndrome)     GERD (gastroesophageal reflux disease)     Hypertension     Low back pain      Past Surgical History:   Procedure Laterality Date    APPENDECTOMY      CARPAL TUNNEL RELEASE      right side    FOOT SURGERY      HYSTERECTOMY       Family History   Problem Relation Age of Onset    Heart disease Mother     COPD Father        Home Medications:  Prior to Admission medications    Medication Sig Start Date End Date Taking? Authorizing Provider   albuterol sulfate  (90 Base) MCG/ACT inhaler  11/6/24  Yes ProviderLizzie MD   Breyna 80-4.5 MCG/ACT inhaler Inhale 2 puffs 2 (Two) Times a Day. 8/15/24  Yes Provider, MD Lizzie   fluticasone (FLONASE) 50 MCG/ACT nasal spray  11/6/24  Yes Provider, MD Lizzie   mirtazapine (REMERON) 30 MG tablet  10/1/24  Yes Provider, MD Lizzie   omeprazole (priLOSEC) 20 MG capsule  11/6/24  Yes Provider, MD Lizzie   Xiidra 5 % ophthalmic solution instill 1 drop into each eye twice daily 9/30/24  Yes Provider, MD Lizzie   buPROPion XL (WELLBUTRIN XL) 150 MG 24 hr tablet Take 1 tablet by mouth Every Morning. 10/13/23   Kaylee Floyd APRN   losartan (COZAAR) 100 MG tablet Take 1 tablet by mouth Daily. 10/13/23   Kaylee Floyd APRN   omeprazole (priLOSEC) 40 MG capsule Take 1 capsule by mouth  "Daily. 10/13/23   Kaylee Floyd APRN   sertraline (ZOLOFT) 50 MG tablet Take 1.5 tablets by mouth Daily. 10/13/23   Kaylee Floyd APRN   traZODone (DESYREL) 50 MG tablet Take 1 tablet by mouth Every Night. 10/13/23   Kaylee Floyd APRN        Social History:   Social History     Tobacco Use    Smoking status: Never     Passive exposure: Past    Smokeless tobacco: Never   Vaping Use    Vaping status: Never Used   Substance Use Topics    Alcohol use: Never    Drug use: Never         Review of Systems:  Review of Systems   Respiratory:  Positive for cough and shortness of breath.         Physical Exam:  /92   Pulse 102   Temp 98.7 °F (37.1 °C)   Resp 22   Ht 154.9 cm (61\")   Wt 76.1 kg (167 lb 12.8 oz)   SpO2 92%   BMI 31.71 kg/m²     Physical Exam  Vitals and nursing note reviewed.   Constitutional:       General: She is not in acute distress.  Cardiovascular:      Rate and Rhythm: Normal rate and regular rhythm.      Heart sounds: Normal heart sounds.   Pulmonary:      Effort: Pulmonary effort is normal. No respiratory distress.      Breath sounds: Examination of the right-upper field reveals rales. Examination of the left-upper field reveals rales. Examination of the right-middle field reveals rales. Examination of the left-middle field reveals rales. Rales present.   Abdominal:      General: Abdomen is flat.   Musculoskeletal:         General: Normal range of motion.      Cervical back: Normal range of motion.   Skin:     General: Skin is warm and dry.   Neurological:      Mental Status: She is alert and oriented to person, place, and time. Mental status is at baseline.                  Procedures:  Procedures      Medical Decision Making:      Comorbidities that affect care:    Pulmonary fibrosis    External Notes reviewed:    Previous Clinic Note: Patient seen 10/13/2023 by PCP for hypertension dyspnea medication refills      The following orders were placed and all results were " independently analyzed by me:  Orders Placed This Encounter   Procedures    COVID-19, FLU A/B, RSV PCR 1 HR TAT - Swab, Nasopharynx    Blood Culture - Blood,    Blood Culture - Blood,    XR Chest 1 View    [place] Draw    Comprehensive Metabolic Panel    BNP    Single High Sensitivity Troponin T    CBC Auto Differential    Lactic Acid, Plasma    NPO Diet NPO Type: Strict NPO    Undress & Gown    Continuous Pulse Oximetry    Vital Signs    General MD Inpatient Consult    General MD Inpatient Consult    Oxygen Therapy- Nasal Cannula; Titrate 1-6 LPM Per SpO2; 90 - 95%    ECG 12 Lead ED Triage Standing Order; SOA    Insert Peripheral IV    CBC & Differential    Green Top (Gel)    Lavender Top    Gold Top - SST    Light Blue Top       Medications Given in the Emergency Department:  Medications   sodium chloride 0.9 % flush 10 mL (has no administration in time range)        ED Course:         Labs:    Lab Results (last 24 hours)       Procedure Component Value Units Date/Time    CBC & Differential [[id number]]  (Abnormal) Collected: 11/14/24 1244    Specimen: Blood Updated: 11/14/24 1252    Narrative:      The following orders were created for panel order CBC & Differential.  Procedure                               Abnormality         Status                     ---------                               -----------         ------                     CBC Auto Differential[[id number]]        Abnormal            Final result                 Please view results for these tests on the individual orders.    Comprehensive Metabolic Panel [[id number]]  (Abnormal) Collected: 11/14/24 1244    Specimen: Blood Updated: 11/14/24 1317     Glucose 147 mg/dL      BUN 14 mg/dL      Creatinine 0.94 mg/dL      Sodium 137 mmol/L      Potassium 3.9 mmol/L      Chloride 99 mmol/L      CO2 24.7 mmol/L      Calcium 9.8 mg/dL      Total Protein 8.0 g/dL      Albumin 4.2 g/dL      ALT (SGPT) 14 U/L      AST (SGOT) 26 U/L      Alkaline Phosphatase 80  U/L      Total Bilirubin 0.4 mg/dL      Globulin 3.8 gm/dL      A/G Ratio 1.1 g/dL      BUN/Creatinine Ratio 14.9     Anion Gap 13.3 mmol/L      eGFR 62.6 mL/min/1.73     Narrative:      GFR Normal >60  Chronic Kidney Disease <60  Kidney Failure <15    The GFR formula is only valid for adults with stable renal function between ages 18 and 70.    BNP [[id number]]  (Normal) Collected: 11/14/24 1244    Specimen: Blood Updated: 11/14/24 1313     proBNP 87.1 pg/mL     Narrative:      This assay is used as an aid in the diagnosis of individuals suspected of having heart failure. It can be used as an aid in the diagnosis of acute decompensated heart failure (ADHF) in patients presenting with signs and symptoms of ADHF to the emergency department (ED). In addition, NT-proBNP of <300 pg/mL indicates ADHF is not likely.    Age Range Result Interpretation  NT-proBNP Concentration (pg/mL:      <50             Positive            >450                   Gray                 300-450                    Negative             <300    50-75           Positive            >900                  Gray                300-900                  Negative            <300      >75             Positive            >1800                  Gray                300-1800                  Negative            <300    Single High Sensitivity Troponin T [[id number]]  (Normal) Collected: 11/14/24 1244    Specimen: Blood Updated: 11/14/24 1317     HS Troponin T 13 ng/L     Narrative:      High Sensitive Troponin T Reference Range:  <14.0 ng/L- Negative Female for AMI  <22.0 ng/L- Negative Male for AMI  >=14 - Abnormal Female indicating possible myocardial injury.  >=22 - Abnormal Male indicating possible myocardial injury.   Clinicians would have to utilize clinical acumen, EKG, Troponin, and serial changes to determine if it is an Acute Myocardial Infarction or myocardial injury due to an underlying chronic condition.         CBC Auto Differential [[id number]]   (Abnormal) Collected: 11/14/24 1244    Specimen: Blood Updated: 11/14/24 1252     WBC 5.14 10*3/mm3      RBC 4.88 10*6/mm3      Hemoglobin 11.7 g/dL      Hematocrit 38.9 %      MCV 79.7 fL      MCH 24.0 pg      MCHC 30.1 g/dL      RDW 14.8 %      RDW-SD 42.9 fl      MPV 10.5 fL      Platelets 204 10*3/mm3      Neutrophil % 65.6 %      Lymphocyte % 24.3 %      Monocyte % 5.8 %      Eosinophil % 3.3 %      Basophil % 0.6 %      Immature Grans % 0.4 %      Neutrophils, Absolute 3.37 10*3/mm3      Lymphocytes, Absolute 1.25 10*3/mm3      Monocytes, Absolute 0.30 10*3/mm3      Eosinophils, Absolute 0.17 10*3/mm3      Basophils, Absolute 0.03 10*3/mm3      Immature Grans, Absolute 0.02 10*3/mm3      nRBC 0.0 /100 WBC     Blood Culture - Blood, Arm, Left [[id number]] Collected: 11/14/24 1244    Specimen: Blood from Arm, Left Updated: 11/14/24 1249    Blood Culture - Blood, Arm, Right [[id number]] Collected: 11/14/24 1244    Specimen: Blood from Arm, Right Updated: 11/14/24 1248    Lactic Acid, Plasma [[id number]]  (Normal) Collected: 11/14/24 1244    Specimen: Blood Updated: 11/14/24 1311     Lactate 1.4 mmol/L     COVID-19, FLU A/B, RSV PCR 1 HR TAT - Swab, Nasopharynx [[id number]]  (Normal) Collected: 11/14/24 1310    Specimen: Swab from Nasopharynx Updated: 11/14/24 1354     COVID19 Not Detected     Influenza A PCR Not Detected     Influenza B PCR Not Detected     RSV, PCR Not Detected    Narrative:      Fact sheet for providers: https://www.fda.gov/media/432497/download    Fact sheet for patients: https://www.fda.gov/media/389460/download    Test performed by PCR.             Imaging:    XR Chest 1 View    Result Date: 11/14/2024  XR CHEST 1 VW Date of Exam: 11/14/2024 12:56 PM EST Indication: SOA Triage Protocol Comparison: Chest AP dated 10/13/2023 Findings: There is chronic elevation of the right hemidiaphragm, unchanged. There is mild irregular density in the mid right lung favored represent scarring/fibrosis.  Scarring/fibrosis is noted in the mid and lower left lung field, similar to the previous study. No acute infiltrate or effusion is seen. The cardiac and mediastinal silhouettes appear normal     Impression: Chronic fibrosis noted in the bilateral lung fields, unchanged. No acute infiltrate. Chronic elevation of the right hemidiaphragm Electronically Signed: Alan Pena MD  11/14/2024 1:23 PM EST  Workstation ID: [id number]       Differential Diagnosis and Discussion:    Dyspnea: Differential diagnosis includes but is not limited to metabolic acidosis, neurological disorders, psychogenic, asthma, pneumothorax, upper airway obstruction, COPD, pneumonia, noncardiogenic pulmonary edema, interstitial lung disease, anemia, congestive heart failure, and pulmonary embolism    All labs were reviewed and interpreted by me.  All X-rays impressions were independently interpreted by me.    MDM  Patient is able to maintain a oxygen saturation over 94% on room air.  Patient declined a breathing treatment.  Patient's pulmonologist, Lawrence Chicas not available so I discussed patient with .  Patient is stable for discharge on a weaning dose of prednisone.    Patient Care Considerations:    ANTIBIOTICS: I considered prescribing antibiotics as an outpatient however no bacterial focus of infection was found.      Consultants/Shared Management Plan:  Patient discussed with Dr. Jonas, pulmonology, who recommends a weaning course of steroids    Social Determinants of Health:    Patient has presented with family members who are responsible, reliable and will ensure follow up care.      Disposition and Care Coordination:    Discharged: The patient is suitable and stable for discharge with no need for consideration of admission.    I have explained the patient´s condition, diagnoses and treatment plan based on the information available to me at this time. I have answered questions and addressed any concerns. The patient has a  good  understanding of the patient´s diagnosis, condition, and treatment plan as can be expected at this point. The vital signs have been stable. The patient´s condition is stable and appropriate for discharge from the emergency department.      The patient will pursue further outpatient evaluation with the primary care physician or other designated or consulting physician as outlined in the discharge instructions. They are agreeable to this plan of care and follow-up instructions have been explained in detail. The patient has received these instructions in written format and has expressed an understanding of the discharge instructions. The patient is aware that any significant change in condition or worsening of symptoms should prompt an immediate return to this or the closest emergency department or call to 911.  I have explained discharge medications and the need for follow up with the patient/caretakers. This was also printed in the discharge instructions. Patient was discharged with the following medications and follow up:      Medication List        New Prescriptions      predniSONE 10 MG tablet  Commonly known as: DELTASONE  Take 6 tablets by mouth Daily for 7 days, THEN 5 tablets Daily for 1 day, THEN 4 tablets Daily for 1 day, THEN 3 tablets Daily for 1 day, THEN 2 tablets Daily for 1 day, THEN 1 tablet Daily for 1 day.  Start taking on: November 14, 2024               Where to Get Your Medications        These medications were sent to [facility] [address] [place] [address] [facility] [phone] [facility] [phone] [address], [place] [address]      Phone: [phone]   predniSONE 10 MG tablet      Cara Camara MD  [address]  [address]  [place] [address]  [phone]    Schedule an appointment as soon as possible for a visit         Final diagnoses:   Pulmonary fibrosis   Dyspnea, unspecified type        ED Disposition       ED Disposition   Discharge    Condition    Stable    Comment   --               This medical record created using voice recognition software.             Pete Martinez DO  11/14/24 2201

## 2024-11-19 ENCOUNTER — HOSPITAL ENCOUNTER (OUTPATIENT)
Dept: SLEEP MEDICINE | Facility: HOSPITAL | Age: 79
Discharge: HOME | End: 2024-11-19
Admitting: INTERNAL MEDICINE
Payer: MEDICARE

## 2024-11-19 DIAGNOSIS — G47.33 OSA (OBSTRUCTIVE SLEEP APNEA): ICD-10-CM

## 2024-11-19 LAB
BACTERIA SPEC AEROBE CULT: NORMAL
BACTERIA SPEC AEROBE CULT: NORMAL

## 2024-11-19 PROCEDURE — 95810 POLYSOM 6/> YRS 4/> PARAM: CPT

## 2024-11-20 ENCOUNTER — TELEPHONE (OUTPATIENT)
Dept: SLEEP MEDICINE | Facility: HOSPITAL | Age: 79
End: 2024-11-20
Payer: MEDICARE

## 2024-11-30 LAB
QT INTERVAL: 365 MS
QTC INTERVAL: 462 MS

## 2025-05-20 ENCOUNTER — TRANSCRIBE ORDERS (OUTPATIENT)
Dept: ADMINISTRATIVE | Facility: HOSPITAL | Age: 80
End: 2025-05-20
Payer: MEDICARE

## 2025-05-20 DIAGNOSIS — N18.31 STAGE 3A CHRONIC KIDNEY DISEASE: Primary | ICD-10-CM

## 2025-05-20 DIAGNOSIS — I10 ESSENTIAL (PRIMARY) HYPERTENSION: ICD-10-CM

## 2025-05-21 ENCOUNTER — LAB (OUTPATIENT)
Facility: HOSPITAL | Age: 80
End: 2025-05-21
Payer: MEDICARE

## 2025-05-21 DIAGNOSIS — N18.31 STAGE 3A CHRONIC KIDNEY DISEASE: ICD-10-CM

## 2025-05-21 DIAGNOSIS — I10 ESSENTIAL (PRIMARY) HYPERTENSION: ICD-10-CM

## 2025-05-21 LAB
ALBUMIN SERPL-MCNC: 4.3 G/DL (ref 3.5–5.2)
ANION GAP SERPL CALCULATED.3IONS-SCNC: 15 MMOL/L (ref 5–15)
BACTERIA UR QL AUTO: ABNORMAL /HPF
BASOPHILS # BLD AUTO: 0.04 10*3/MM3 (ref 0–0.2)
BASOPHILS NFR BLD AUTO: 0.5 % (ref 0–1.5)
BILIRUB UR QL STRIP: NEGATIVE
BUN SERPL-MCNC: 23 MG/DL (ref 8–23)
BUN/CREAT SERPL: 20.9 (ref 7–25)
CALCIUM SPEC-SCNC: 10.2 MG/DL (ref 8.6–10.5)
CHLORIDE SERPL-SCNC: 104 MMOL/L (ref 98–107)
CLARITY UR: CLEAR
CO2 SERPL-SCNC: 17 MMOL/L (ref 22–29)
COLOR UR: YELLOW
CREAT SERPL-MCNC: 1.1 MG/DL (ref 0.57–1)
CREAT UR-MCNC: 72.8 MG/DL
DEPRECATED RDW RBC AUTO: 42.6 FL (ref 37–54)
EGFRCR SERPLBLD CKD-EPI 2021: 51.5 ML/MIN/1.73
EOSINOPHIL # BLD AUTO: 0.2 10*3/MM3 (ref 0–0.4)
EOSINOPHIL NFR BLD AUTO: 2.6 % (ref 0.3–6.2)
ERYTHROCYTE [DISTWIDTH] IN BLOOD BY AUTOMATED COUNT: 14.5 % (ref 12.3–15.4)
GLUCOSE SERPL-MCNC: 105 MG/DL (ref 65–99)
GLUCOSE UR STRIP-MCNC: NEGATIVE MG/DL
HCT VFR BLD AUTO: 39.5 % (ref 34–46.6)
HGB BLD-MCNC: 12.3 G/DL (ref 12–15.9)
HGB UR QL STRIP.AUTO: NEGATIVE
HYALINE CASTS UR QL AUTO: ABNORMAL /LPF
IMM GRANULOCYTES # BLD AUTO: 0.01 10*3/MM3 (ref 0–0.05)
IMM GRANULOCYTES NFR BLD AUTO: 0.1 % (ref 0–0.5)
KETONES UR QL STRIP: NEGATIVE
LEUKOCYTE ESTERASE UR QL STRIP.AUTO: ABNORMAL
LYMPHOCYTES # BLD AUTO: 2.34 10*3/MM3 (ref 0.7–3.1)
LYMPHOCYTES NFR BLD AUTO: 30.2 % (ref 19.6–45.3)
MCH RBC QN AUTO: 25.1 PG (ref 26.6–33)
MCHC RBC AUTO-ENTMCNC: 31.1 G/DL (ref 31.5–35.7)
MCV RBC AUTO: 80.6 FL (ref 79–97)
MONOCYTES # BLD AUTO: 0.41 10*3/MM3 (ref 0.1–0.9)
MONOCYTES NFR BLD AUTO: 5.3 % (ref 5–12)
NEUTROPHILS NFR BLD AUTO: 4.75 10*3/MM3 (ref 1.7–7)
NEUTROPHILS NFR BLD AUTO: 61.3 % (ref 42.7–76)
NITRITE UR QL STRIP: NEGATIVE
NRBC BLD AUTO-RTO: 0 /100 WBC (ref 0–0.2)
PH UR STRIP.AUTO: 6 [PH] (ref 5–8)
PHOSPHATE SERPL-MCNC: 3.2 MG/DL (ref 2.5–4.5)
PLATELET # BLD AUTO: 299 10*3/MM3 (ref 140–450)
PMV BLD AUTO: 11.7 FL (ref 6–12)
POTASSIUM SERPL-SCNC: 4.7 MMOL/L (ref 3.5–5.2)
PROT ?TM UR-MCNC: 9.6 MG/DL
PROT UR QL STRIP: NEGATIVE
PROT/CREAT UR: 0.13 MG/G{CREAT}
PTH-INTACT SERPL-MCNC: 38.1 PG/ML (ref 15–65)
RBC # BLD AUTO: 4.9 10*6/MM3 (ref 3.77–5.28)
RBC # UR STRIP: ABNORMAL /HPF
REF LAB TEST METHOD: ABNORMAL
SODIUM SERPL-SCNC: 136 MMOL/L (ref 136–145)
SP GR UR STRIP: 1.01 (ref 1–1.03)
SQUAMOUS #/AREA URNS HPF: ABNORMAL /HPF
UROBILINOGEN UR QL STRIP: ABNORMAL
WBC # UR STRIP: ABNORMAL /HPF
WBC NRBC COR # BLD AUTO: 7.75 10*3/MM3 (ref 3.4–10.8)

## 2025-05-21 PROCEDURE — 81001 URINALYSIS AUTO W/SCOPE: CPT

## 2025-05-21 PROCEDURE — 83970 ASSAY OF PARATHORMONE: CPT

## 2025-05-21 PROCEDURE — 80069 RENAL FUNCTION PANEL: CPT

## 2025-05-21 PROCEDURE — 82570 ASSAY OF URINE CREATININE: CPT

## 2025-05-21 PROCEDURE — 36415 COLL VENOUS BLD VENIPUNCTURE: CPT

## 2025-05-21 PROCEDURE — 85025 COMPLETE CBC W/AUTO DIFF WBC: CPT

## 2025-05-21 PROCEDURE — 84156 ASSAY OF PROTEIN URINE: CPT

## 2025-07-11 ENCOUNTER — TRANSCRIBE ORDERS (OUTPATIENT)
Dept: ADMINISTRATIVE | Facility: HOSPITAL | Age: 80
End: 2025-07-11
Payer: MEDICARE

## 2025-07-11 DIAGNOSIS — I10 ESSENTIAL HYPERTENSION, MALIGNANT: ICD-10-CM

## 2025-07-11 DIAGNOSIS — N18.31 CHRONIC KIDNEY DISEASE (CKD) STAGE G3A/A1, MODERATELY DECREASED GLOMERULAR FILTRATION RATE (GFR) BETWEEN 45-59 ML/MIN/1.73 SQUARE METER AND ALBUMINURIA CREATININE RATIO LESS THAN 30 MG/G (CMS/H*: Primary | ICD-10-CM

## 2025-07-16 ENCOUNTER — LAB (OUTPATIENT)
Facility: HOSPITAL | Age: 80
End: 2025-07-16
Payer: MEDICARE

## 2025-07-16 DIAGNOSIS — I10 ESSENTIAL HYPERTENSION, MALIGNANT: ICD-10-CM

## 2025-07-16 DIAGNOSIS — N18.31 CHRONIC KIDNEY DISEASE (CKD) STAGE G3A/A1, MODERATELY DECREASED GLOMERULAR FILTRATION RATE (GFR) BETWEEN 45-59 ML/MIN/1.73 SQUARE METER AND ALBUMINURIA CREATININE RATIO LESS THAN 30 MG/G (CMS/H*: ICD-10-CM

## 2025-07-16 LAB
ALBUMIN SERPL-MCNC: 4.3 G/DL (ref 3.5–5.2)
ANION GAP SERPL CALCULATED.3IONS-SCNC: 13.2 MMOL/L (ref 5–15)
BACTERIA UR QL AUTO: NORMAL /HPF
BASOPHILS # BLD AUTO: 0.05 10*3/MM3 (ref 0–0.2)
BASOPHILS NFR BLD AUTO: 0.6 % (ref 0–1.5)
BILIRUB UR QL STRIP: NEGATIVE
BUN SERPL-MCNC: 17 MG/DL (ref 8–23)
BUN/CREAT SERPL: 15.7 (ref 7–25)
CALCIUM SPEC-SCNC: 10.6 MG/DL (ref 8.6–10.5)
CHLORIDE SERPL-SCNC: 102 MMOL/L (ref 98–107)
CLARITY UR: CLEAR
CO2 SERPL-SCNC: 23.8 MMOL/L (ref 22–29)
COLOR UR: YELLOW
CREAT SERPL-MCNC: 1.08 MG/DL (ref 0.57–1)
CREAT UR-MCNC: 64.5 MG/DL
DEPRECATED RDW RBC AUTO: 42 FL (ref 37–54)
EGFRCR SERPLBLD CKD-EPI 2021: 52.7 ML/MIN/1.73
EOSINOPHIL # BLD AUTO: 0.19 10*3/MM3 (ref 0–0.4)
EOSINOPHIL NFR BLD AUTO: 2.3 % (ref 0.3–6.2)
ERYTHROCYTE [DISTWIDTH] IN BLOOD BY AUTOMATED COUNT: 14.3 % (ref 12.3–15.4)
GLUCOSE SERPL-MCNC: 112 MG/DL (ref 65–99)
GLUCOSE UR STRIP-MCNC: NEGATIVE MG/DL
HCT VFR BLD AUTO: 37 % (ref 34–46.6)
HGB BLD-MCNC: 11.4 G/DL (ref 12–15.9)
HGB UR QL STRIP.AUTO: NEGATIVE
HYALINE CASTS UR QL AUTO: NORMAL /LPF
IMM GRANULOCYTES # BLD AUTO: 0.02 10*3/MM3 (ref 0–0.05)
IMM GRANULOCYTES NFR BLD AUTO: 0.2 % (ref 0–0.5)
KETONES UR QL STRIP: NEGATIVE
LEUKOCYTE ESTERASE UR QL STRIP.AUTO: ABNORMAL
LYMPHOCYTES # BLD AUTO: 2.37 10*3/MM3 (ref 0.7–3.1)
LYMPHOCYTES NFR BLD AUTO: 28.5 % (ref 19.6–45.3)
MCH RBC QN AUTO: 25 PG (ref 26.6–33)
MCHC RBC AUTO-ENTMCNC: 30.8 G/DL (ref 31.5–35.7)
MCV RBC AUTO: 81.1 FL (ref 79–97)
MONOCYTES # BLD AUTO: 0.48 10*3/MM3 (ref 0.1–0.9)
MONOCYTES NFR BLD AUTO: 5.8 % (ref 5–12)
NEUTROPHILS NFR BLD AUTO: 5.22 10*3/MM3 (ref 1.7–7)
NEUTROPHILS NFR BLD AUTO: 62.6 % (ref 42.7–76)
NITRITE UR QL STRIP: NEGATIVE
NRBC BLD AUTO-RTO: 0 /100 WBC (ref 0–0.2)
PH UR STRIP.AUTO: 6 [PH] (ref 5–8)
PHOSPHATE SERPL-MCNC: 3.8 MG/DL (ref 2.5–4.5)
PLATELET # BLD AUTO: 277 10*3/MM3 (ref 140–450)
PMV BLD AUTO: 11 FL (ref 6–12)
POTASSIUM SERPL-SCNC: 4.4 MMOL/L (ref 3.5–5.2)
PROT ?TM UR-MCNC: 7.4 MG/DL
PROT UR QL STRIP: NEGATIVE
PROT/CREAT UR: 0.11 MG/G{CREAT}
PTH-INTACT SERPL-MCNC: 35.9 PG/ML (ref 15–65)
RBC # BLD AUTO: 4.56 10*6/MM3 (ref 3.77–5.28)
RBC # UR STRIP: NORMAL /HPF
REF LAB TEST METHOD: NORMAL
SODIUM SERPL-SCNC: 139 MMOL/L (ref 136–145)
SP GR UR STRIP: 1.01 (ref 1–1.03)
SQUAMOUS #/AREA URNS HPF: NORMAL /HPF
UROBILINOGEN UR QL STRIP: ABNORMAL
WBC # UR STRIP: NORMAL /HPF
WBC NRBC COR # BLD AUTO: 8.33 10*3/MM3 (ref 3.4–10.8)

## 2025-07-16 PROCEDURE — 36415 COLL VENOUS BLD VENIPUNCTURE: CPT

## 2025-07-16 PROCEDURE — 84156 ASSAY OF PROTEIN URINE: CPT

## 2025-07-16 PROCEDURE — 82570 ASSAY OF URINE CREATININE: CPT

## 2025-07-16 PROCEDURE — 81001 URINALYSIS AUTO W/SCOPE: CPT

## 2025-07-16 PROCEDURE — 80069 RENAL FUNCTION PANEL: CPT

## 2025-07-16 PROCEDURE — 83970 ASSAY OF PARATHORMONE: CPT

## 2025-07-16 PROCEDURE — 85025 COMPLETE CBC W/AUTO DIFF WBC: CPT
